# Patient Record
Sex: FEMALE | URBAN - METROPOLITAN AREA
[De-identification: names, ages, dates, MRNs, and addresses within clinical notes are randomized per-mention and may not be internally consistent; named-entity substitution may affect disease eponyms.]

---

## 2023-12-08 ENCOUNTER — ROUTINE PRENATAL (OUTPATIENT)
Dept: OBGYN CLINIC | Age: 23
End: 2023-12-08

## 2023-12-08 VITALS
HEIGHT: 64 IN | BODY MASS INDEX: 26.98 KG/M2 | DIASTOLIC BLOOD PRESSURE: 78 MMHG | WEIGHT: 158 LBS | SYSTOLIC BLOOD PRESSURE: 124 MMHG

## 2023-12-08 DIAGNOSIS — Z34.81 MULTIGRAVIDA IN FIRST TRIMESTER: ICD-10-CM

## 2023-12-08 DIAGNOSIS — Z12.4 PAP SMEAR FOR CERVICAL CANCER SCREENING: ICD-10-CM

## 2023-12-08 DIAGNOSIS — O36.80X0 ENCOUNTER TO DETERMINE FETAL VIABILITY OF PREGNANCY, SINGLE OR UNSPECIFIED FETUS: Primary | ICD-10-CM

## 2023-12-08 DIAGNOSIS — N83.201 RIGHT OVARIAN CYST: ICD-10-CM

## 2023-12-08 DIAGNOSIS — Z11.3 SCREEN FOR STD (SEXUALLY TRANSMITTED DISEASE): ICD-10-CM

## 2023-12-08 LAB
ABO + RH BLD: NORMAL
AMPHET UR QL SCN: NEGATIVE
BARBITURATES UR QL SCN: NEGATIVE
BENZODIAZ UR QL: NEGATIVE
BLOOD GROUP ANTIBODIES SERPL: NORMAL
CANNABINOIDS UR QL SCN: POSITIVE
COCAINE UR QL SCN: NEGATIVE
ERYTHROCYTE [DISTWIDTH] IN BLOOD BY AUTOMATED COUNT: 13.1 % (ref 11.9–14.6)
HBV SURFACE AG SER QL: NONREACTIVE
HCT VFR BLD AUTO: 38.6 % (ref 35.8–46.3)
HCV AB SER QL: NONREACTIVE
HGB BLD-MCNC: 12.5 G/DL (ref 11.7–15.4)
HIV 1+2 AB+HIV1 P24 AG SERPL QL IA: NONREACTIVE
MCH RBC QN AUTO: 27.7 PG (ref 26.1–32.9)
MCHC RBC AUTO-ENTMCNC: 32.4 G/DL (ref 31.4–35)
MCV RBC AUTO: 85.6 FL (ref 82–102)
METHADONE UR QL: NEGATIVE
NRBC # BLD: 0 K/UL (ref 0–0.2)
OPIATES UR QL: NEGATIVE
PCP UR QL: NEGATIVE
PLATELET # BLD AUTO: 344 K/UL (ref 150–450)
PMV BLD AUTO: 9.5 FL (ref 9.4–12.3)
RBC # BLD AUTO: 4.51 M/UL (ref 4.05–5.2)
RUBV IGG SERPL IA-ACNC: 62.5 IU/ML
WBC # BLD AUTO: 9.3 K/UL (ref 4.3–11.1)

## 2023-12-08 PROCEDURE — 99902 PR PRENATAL VISIT: CPT | Performed by: NURSE PRACTITIONER

## 2023-12-08 RX ORDER — ONDANSETRON 4 MG/1
8 TABLET, FILM COATED ORAL 2 TIMES DAILY PRN
COMMUNITY
Start: 2023-04-03 | End: 2023-12-08

## 2023-12-08 NOTE — PROGRESS NOTES
Chaperone for Intimate Exam     Chaperone was offer accepted as part of the rooming process    Chaperone: Cabrera Garcia
I have reviewed the patient's visit today including history, exam and assessment by TERESA Eller. I agree with treatment/plan as above.     Guille Beck MD  10:23 AM  12/08/23
Patient comes in today for initial prenatal visit. No complaints/concerns today. Fetal Movements:  No  Contractions:  No  Vaginal Bleeding:  No  Leaking Fluid:  No  GI/ issues:  Yes-nausea, dramamine is helpful. Drug/Alcohol 4P's Plus Screening    1. Have either of your parents ever had a problem with drugs/alcohol/prescription drugs? No  2. Does your partner have a problem with drugs/alcohol/prescription drugs? No  3. In the past, have you ever had a problem with drugs/alcohol/prescription drugs? No  4. Before you were pregnant, in the past month, have you done any drugs, drank any alcohol or abused any prescription drugs? No  If \"YES\" to any of the above, please give further details:  N/a     LAST PAP:  never     LAST MAMMO:  never     LMP:  Patient's last menstrual period was 10/08/2023.     FAMILY HISTORY OF:   Breast Cancer:  Yes-maternal aunt    Ovarian Cancer:  No   Uterine Cancer:  No   Colon Cancer:  No    Vitals:    12/08/23 0953   BP: 124/78   Site: Right Upper Arm   Position: Sitting   Weight: 71.7 kg (158 lb)        Sherryle Masters, RN  12/08/23  10:08 AM
12/8/2023: RIGHT OV CONTAINS A SIMPLE CYST MEASURING 4.0 X 2.8 X 3.9 CM          Problem List Items Addressed This Visit          Endocrine    Right ovarian cyst       Other    Multigravida in first trimester      History reviewed  PNV's ordered (if not already taking)  PN labs, UDS ordered  Problem list reviewed  OB physical completed  OB prenatal packet/education reviewed: Information included discusses general pregnancy topics, fetal development, weight gain, nutrition, breastfeeding, risky behaviors, WIC, vaccinations and hospital information. RTO 4 weeks with NIPT labs  PTL/labor precautions, [place], and pregnancy warning signs reviewed. Genetic testing - D/W pt at length genetic testing that is recommended by ACOG -- NIPT, Quad screen (for trisomies and NTD), CF, SMA. Brief discussion of these diseases - conditions that may increase risks, etiology, carrier states, fetal effects, treatment options, etc was undertaken. D/W pt that these are screening tests/carrier screening test only and are NOT mandatory. We also discuss false POS/false neg rates. It is her decision whether to have them done and how to proceed with the information afterwards.                Relevant Orders    PAP IG, CT-NG-TV, rfx Aptima HPV ASCUS ([id number])    ABO/Rh    Antibody Screen    CBC    Hemoglobinopathy Evaluation    Hepatitis B Surface Antigen    Hepatitis C Antibody    RPR    HIV 1/2 Ag/Ab, 4TH Generation,W Rflx Confirm    Rubella antibody, IgG    Hemoglobin A1C    Urine Drug Screen     Other Visit Diagnoses       Encounter to determine fetal viability of pregnancy, single or unspecified fetus    -  Primary    Relevant Orders    AMB POC US OB < 14 WKS, 1ST GESTATION (Completed)    Pap smear for cervical cancer screening        Relevant Orders    PAP IG, CT-NG-TV, rfx Aptima HPV ASCUS ([id number])    Screen for STD (sexually transmitted disease)        Relevant Orders    PAP IG, CT-NG-TV, rfx Aptima HPV ASCUS ([id number])

## 2023-12-08 NOTE — ASSESSMENT & PLAN NOTE
History reviewed  PNV's ordered (if not already taking)  PN labs, UDS ordered  Problem list reviewed  OB physical completed  OB prenatal packet/education reviewed: Information included discusses general pregnancy topics, fetal development, weight gain, nutrition, breastfeeding, risky behaviors, WIC, vaccinations and hospital information. RTO 4 weeks with NIPT labs  PTL/labor precautions, [place], and pregnancy warning signs reviewed. Genetic testing - D/W pt at length genetic testing that is recommended by ACOG -- NIPT, Quad screen (for trisomies and NTD), CF, SMA. Brief discussion of these diseases - conditions that may increase risks, etiology, carrier states, fetal effects, treatment options, etc was undertaken. D/W pt that these are screening tests/carrier screening test only and are NOT mandatory. We also discuss false POS/false neg rates. It is her decision whether to have them done and how to proceed with the information afterwards.

## 2023-12-09 LAB
EST. AVERAGE GLUCOSE BLD GHB EST-MCNC: 97 MG/DL
HBA1C MFR BLD: 5 % (ref 4.8–5.6)

## 2023-12-10 ENCOUNTER — TELEPHONE (OUTPATIENT)
Dept: OBGYN CLINIC | Age: 23
End: 2023-12-10

## 2023-12-10 DIAGNOSIS — O99.321 DRUG USE AFFECTING PREGNANCY, FIRST TRIMESTER: Primary | ICD-10-CM

## 2023-12-10 LAB — RPR SER QL: NONREACTIVE

## 2023-12-10 NOTE — TELEPHONE ENCOUNTER
Please call pt and let her know her drug screen was positive for marijuana      We recommend she discontinue use immediately and throughout the pregnancy.  Marijuana use in pregnancy has been linked with increase adverse risks for baby (autism) and possible PTB    We will repeat another drug screen later in the pregnancy

## 2023-12-12 LAB
HGB A MFR BLD: 97.6 % (ref 96.4–98.8)
HGB A2 MFR BLD COLUMN CHROM: 2.4 % (ref 1.8–3.2)
HGB F MFR BLD: 0 % (ref 0–2)
HGB FRACT BLD-IMP: NORMAL
HGB S MFR BLD: 0 %

## 2023-12-14 LAB
C TRACH RRNA CVX QL NAA+PROBE: NEGATIVE
COLLECTION METHOD: NORMAL
CYTOLOGIST CVX/VAG CYTO: NORMAL
CYTOLOGY CVX/VAG DOC THIN PREP: NORMAL
DATE OF LMP: NORMAL
HPV REFLEX: NORMAL
Lab: NORMAL
N GONORRHOEA RRNA CVX QL NAA+PROBE: NEGATIVE
OTHER PT INFO: NORMAL
PAP SOURCE: NORMAL
PATH REPORT.FINAL DX SPEC: NORMAL
PREV CYTO INFO: NORMAL
PREV TREATMENT RESULTS: NORMAL
PREV TREATMENT: NORMAL
STAT OF ADQ CVX/VAG CYTO-IMP: NORMAL
T VAGINALIS RRNA SPEC QL NAA+PROBE: NEGATIVE

## 2024-01-04 PROBLEM — N83.201 RIGHT OVARIAN CYST: Status: RESOLVED | Noted: 2023-12-08 | Resolved: 2024-01-04

## 2024-01-05 ENCOUNTER — ROUTINE PRENATAL (OUTPATIENT)
Dept: OBGYN CLINIC | Age: 24
End: 2024-01-05

## 2024-01-05 VITALS
SYSTOLIC BLOOD PRESSURE: 122 MMHG | DIASTOLIC BLOOD PRESSURE: 68 MMHG | WEIGHT: 167 LBS | BODY MASS INDEX: 28.51 KG/M2 | HEIGHT: 64 IN

## 2024-01-05 DIAGNOSIS — Z34.81 ENCOUNTER FOR SUPERVISION OF OTHER NORMAL PREGNANCY IN FIRST TRIMESTER: ICD-10-CM

## 2024-01-05 DIAGNOSIS — Z34.81 MULTIGRAVIDA IN FIRST TRIMESTER: Primary | ICD-10-CM

## 2024-01-05 DIAGNOSIS — Z34.01 ENCOUNTER FOR SUPERVISION OF NORMAL FIRST PREGNANCY IN FIRST TRIMESTER: ICD-10-CM

## 2024-01-05 DIAGNOSIS — O99.321 DRUG USE AFFECTING PREGNANCY, FIRST TRIMESTER: ICD-10-CM

## 2024-01-05 RX ORDER — ONDANSETRON 4 MG/1
4 TABLET, ORALLY DISINTEGRATING ORAL 3 TIMES DAILY PRN
Qty: 28 TABLET | Refills: 1 | Status: SHIPPED | OUTPATIENT
Start: 2024-01-05

## 2024-01-05 SDOH — ECONOMIC STABILITY: FOOD INSECURITY: WITHIN THE PAST 12 MONTHS, YOU WORRIED THAT YOUR FOOD WOULD RUN OUT BEFORE YOU GOT MONEY TO BUY MORE.: NEVER TRUE

## 2024-01-05 SDOH — ECONOMIC STABILITY: HOUSING INSECURITY
IN THE LAST 12 MONTHS, WAS THERE A TIME WHEN YOU DID NOT HAVE A STEADY PLACE TO SLEEP OR SLEPT IN A SHELTER (INCLUDING NOW)?: NO

## 2024-01-05 SDOH — ECONOMIC STABILITY: TRANSPORTATION INSECURITY
IN THE PAST 12 MONTHS, HAS LACK OF TRANSPORTATION KEPT YOU FROM MEETINGS, WORK, OR FROM GETTING THINGS NEEDED FOR DAILY LIVING?: NO

## 2024-01-05 SDOH — ECONOMIC STABILITY: INCOME INSECURITY: HOW HARD IS IT FOR YOU TO PAY FOR THE VERY BASICS LIKE FOOD, HOUSING, MEDICAL CARE, AND HEATING?: NOT VERY HARD

## 2024-01-05 SDOH — ECONOMIC STABILITY: FOOD INSECURITY: WITHIN THE PAST 12 MONTHS, THE FOOD YOU BOUGHT JUST DIDN'T LAST AND YOU DIDN'T HAVE MONEY TO GET MORE.: NEVER TRUE

## 2024-01-05 ASSESSMENT — PATIENT HEALTH QUESTIONNAIRE - PHQ9
SUM OF ALL RESPONSES TO PHQ QUESTIONS 1-9: 0
2. FEELING DOWN, DEPRESSED OR HOPELESS: 0
SUM OF ALL RESPONSES TO PHQ QUESTIONS 1-9: 0
1. LITTLE INTEREST OR PLEASURE IN DOING THINGS: 0
SUM OF ALL RESPONSES TO PHQ QUESTIONS 1-9: 0
SUM OF ALL RESPONSES TO PHQ QUESTIONS 1-9: 0
SUM OF ALL RESPONSES TO PHQ9 QUESTIONS 1 & 2: 0

## 2024-01-05 NOTE — PROGRESS NOTES
Patient comes in today for routine prenatal visit. No complaints/concerns today.     Fetal Movement: No  Contractions: No  Vaginal Bleeding: No  Leaking Fluid: No  GI/: Yes-nausea, dramamine and shahana chewables, slightly helpful.     Vitals:    01/05/24 0915   BP: 122/68   Site: Right Upper Arm   Position: Sitting   Weight: 75.8 kg (167 lb)   Height: 1.613 m (5' 3.5\")

## 2024-01-05 NOTE — ASSESSMENT & PLAN NOTE
Instructed pt to contact the office or seek immediate care if develops fever > 101.0, severe lower abdominal pain or heavy vaginal bleeding (soaking 2 or more pads per hour).    D/W pt at length genetic testing that is recommended by ACOG -- NIPT, Quad screen (for trisomies and NTD), CF, SMA.  Brief discussion of these diseases - conditions that may increase risks, etiology, carrier states, fetal effects, treatment options, etc was undertaken. D/W pt that these are screening tests only and are NOT mandatory. It is her decision whether to have them done and how to proceed with the information afterwards.  All questions answered, pt understood and wishes to proceed with indicated tests.

## 2024-01-05 NOTE — PROGRESS NOTES
Chief Complaint   Patient presents with    Routine Prenatal Visit        This 23 y.o.  at 12w5d with Estimated Date of Delivery: 24 presents for routine prenatal visit. Patient has some complaints today. Pt reports no LOF, VB, ctx. Pt denies H/A, vision changes, abdom pain, (+) N/V.    Vitals:    24 0915   BP: 122/68   Site: Right Upper Arm   Position: Sitting   Weight: 75.8 kg (167 lb)   Height: 1.613 m (5' 3.5\")        Patient Active Problem List    Diagnosis Date Noted    Drug use affecting pregnancy, first trimester 12/10/2023     Overview Note:     (+) THC at initial OBV -- recheck 3rd trimester     NEEDS CORD SEGMENT AT DELIVERY         Assessment & Plan Note:     noted      Multigravida in first trimester 2023     Overview Note:     EDC by LMP confirmed by 8 5/7 week US -- NULLIPAROUS       Assessment & Plan Note:     Instructed pt to contact the office or seek immediate care if develops fever > 101.0, severe lower abdominal pain or heavy vaginal bleeding (soaking 2 or more pads per hour).    D/W pt at length genetic testing that is recommended by ACOG -- NIPT, Quad screen (for trisomies and NTD), CF, SMA.  Brief discussion of these diseases - conditions that may increase risks, etiology, carrier states, fetal effects, treatment options, etc was undertaken. D/W pt that these are screening tests only and are NOT mandatory. It is her decision whether to have them done and how to proceed with the information afterwards.  All questions answered, pt understood and wishes to proceed with indicated tests.         Problem List Items Addressed This Visit       Multigravida in first trimester - Primary     Instructed pt to contact the office or seek immediate care if develops fever > 101.0, severe lower abdominal pain or heavy vaginal bleeding (soaking 2 or more pads per hour).    D/W pt at length genetic testing that is recommended by ACOG -- NIPT, Quad screen (for trisomies and NTD), CF, SMA.

## 2024-01-05 NOTE — PATIENT INSTRUCTIONS
We will call you if anything is abnormal from your testing today.  Please contact the office or seek immediate care if you develop fever > 101.0, severe lower abdominal pain or heavy vaginal bleeding (soaking 2 or more pads per hour).   Thanks for coming to see us today and letting us take care of you!

## 2024-01-12 LAB
Lab: NEGATIVE
Lab: NEGATIVE
Lab: NORMAL
NTRA 1P36 DELETION SYNDROME POPULATION-BASED RISK TEXT: NORMAL
NTRA 1P36 DELETION SYNDROME RESULT TEXT: NORMAL
NTRA 1P36 DELETION SYNDROME RISK SCORE TEXT: NORMAL
NTRA 22Q11.2 DELETION SYNDROME POPULATION-BASED RISK TEXT: NORMAL
NTRA 22Q11.2 DELETION SYNDROME RESULT TEXT: NORMAL
NTRA 22Q11.2 DELETION SYNDROME RISK SCORE TEXT: NORMAL
NTRA ANGELMAN SYNDROME POPULATION-BASED RISK TEXT: NORMAL
NTRA ANGELMAN SYNDROME RESULT TEXT: NORMAL
NTRA ANGELMAN SYNDROME RISK SCORE TEXT: NORMAL
NTRA CRI-DU-CHAT SYNDROME POPULATION-BASED RISK TEXT: NORMAL
NTRA CRI-DU-CHAT SYNDROME RESULT TEXT: NORMAL
NTRA CRI-DU-CHAT SYNDROME RISK SCORE TEXT: NORMAL
NTRA CYSTIC FIBROSIS: NEGATIVE
NTRA DUCHENNE/BECKER MUSCULAR DYSTROPHY: NEGATIVE
NTRA FETAL FRACTION SECOND FETUS: NORMAL
NTRA FETAL FRACTION: NORMAL
NTRA FRAGILE X SYNDROME: NEGATIVE
NTRA GENDER OF FETUS: NORMAL
NTRA GENDER OF SECOND FETUS: NORMAL
NTRA MONOSOMY X AGE-BASED RISK TEXT: NORMAL
NTRA MONOSOMY X RESULT TEXT: NORMAL
NTRA MONOSOMY X RISK SCORE TEXT: NORMAL
NTRA PDF REPORT: NORMAL
NTRA PRADER-WILLI SYNDROME POPULATION-BASED RISK TEXT: NORMAL
NTRA PRADER-WILLI SYNDROME RESULT TEXT: NORMAL
NTRA PRADER-WILLI SYNDROME RISK SCORE TEXT: NORMAL
NTRA SPINAL MUSCULAR ATROPHY: NEGATIVE
NTRA TRIPLOIDY 13 18 AGE-BASED RISK TEXT: NORMAL
NTRA TRIPLOIDY 13 18 RESULT TEXT: NORMAL
NTRA TRIPLOIDY 13 18 RISK SCORE TEXT: NORMAL
NTRA TRIPLOIDY RESULT TEXT: NORMAL
NTRA TRISOMY 13 AGE-BASED RISK TEXT: NORMAL
NTRA TRISOMY 13 RESULT TEXT: NORMAL
NTRA TRISOMY 13 RISK SCORE TEXT: NORMAL
NTRA TRISOMY 18 AGE-BASED RISK TEXT: NORMAL
NTRA TRISOMY 18 RESULT TEXT: NORMAL
NTRA TRISOMY 18 RISK SCORE TEXT: NORMAL
NTRA TRISOMY 21 AGE-BASED RISK TEXT: NORMAL
NTRA TRISOMY 21 RESULT TEXT: NORMAL
NTRA TRISOMY 21 RISK SCORE TEXT: NORMAL
NTRA ZYGOSITY: NORMAL

## 2024-01-16 LAB
Lab: NORMAL
NTRA FETAL FRACTION: NORMAL
NTRA GENDER OF FETUS: NORMAL
NTRA MONOSOMY X AGE-BASED RISK TEXT: NORMAL
NTRA MONOSOMY X RESULT TEXT: NORMAL
NTRA MONOSOMY X RISK SCORE TEXT: NORMAL
NTRA TRIPLOIDY RESULT TEXT: NORMAL
NTRA TRISOMY 13 AGE-BASED RISK TEXT: NORMAL
NTRA TRISOMY 13 RESULT TEXT: NORMAL
NTRA TRISOMY 13 RISK SCORE TEXT: NORMAL
NTRA TRISOMY 18 AGE-BASED RISK TEXT: NORMAL
NTRA TRISOMY 18 RESULT TEXT: NORMAL
NTRA TRISOMY 18 RISK SCORE TEXT: NORMAL
NTRA TRISOMY 21 AGE-BASED RISK TEXT: NORMAL
NTRA TRISOMY 21 RESULT TEXT: NORMAL
NTRA TRISOMY 21 RISK SCORE TEXT: NORMAL

## 2024-01-17 ENCOUNTER — TELEPHONE (OUTPATIENT)
Dept: OBGYN CLINIC | Age: 24
End: 2024-01-17

## 2024-01-17 NOTE — TELEPHONE ENCOUNTER
----- Message from Yannick Sarabia MD sent at 1/17/2024  8:06 AM EST -----  Please let pt know that all her genetic testing was normal/negative  If she wants to know -- it's a BOY

## 2024-01-17 NOTE — TELEPHONE ENCOUNTER
Patient returned call. Patient was informed of results. Patient states she seen the gender on My Chart. Patient voiced understanding.

## 2024-02-05 ENCOUNTER — ROUTINE PRENATAL (OUTPATIENT)
Dept: OBGYN CLINIC | Age: 24
End: 2024-02-05
Payer: COMMERCIAL

## 2024-02-05 VITALS
OXYGEN SATURATION: 98 % | WEIGHT: 177 LBS | HEART RATE: 89 BPM | HEIGHT: 64 IN | DIASTOLIC BLOOD PRESSURE: 64 MMHG | SYSTOLIC BLOOD PRESSURE: 108 MMHG | BODY MASS INDEX: 30.22 KG/M2

## 2024-02-05 DIAGNOSIS — R00.2 HEART PALPITATIONS: ICD-10-CM

## 2024-02-05 DIAGNOSIS — O99.321 DRUG USE AFFECTING PREGNANCY, FIRST TRIMESTER: ICD-10-CM

## 2024-02-05 DIAGNOSIS — Z34.82 MULTIGRAVIDA IN SECOND TRIMESTER: Primary | ICD-10-CM

## 2024-02-05 DIAGNOSIS — Z34.82 MULTIGRAVIDA IN SECOND TRIMESTER: ICD-10-CM

## 2024-02-05 LAB
ALBUMIN SERPL-MCNC: 3.3 G/DL (ref 3.5–5)
ALBUMIN/GLOB SERPL: 0.9 (ref 0.4–1.6)
ALP SERPL-CCNC: 56 U/L (ref 50–136)
ALT SERPL-CCNC: 97 U/L (ref 12–65)
ANION GAP SERPL CALC-SCNC: 4 MMOL/L (ref 2–11)
AST SERPL-CCNC: 72 U/L (ref 15–37)
BILIRUB SERPL-MCNC: 0.4 MG/DL (ref 0.2–1.1)
BUN SERPL-MCNC: 6 MG/DL (ref 6–23)
CALCIUM SERPL-MCNC: 9.5 MG/DL (ref 8.3–10.4)
CHLORIDE SERPL-SCNC: 108 MMOL/L (ref 103–113)
CO2 SERPL-SCNC: 23 MMOL/L (ref 21–32)
CREAT SERPL-MCNC: 0.7 MG/DL (ref 0.6–1)
ERYTHROCYTE [DISTWIDTH] IN BLOOD BY AUTOMATED COUNT: 12.8 % (ref 11.9–14.6)
GLOBULIN SER CALC-MCNC: 3.5 G/DL (ref 2.8–4.5)
GLUCOSE SERPL-MCNC: 78 MG/DL (ref 65–100)
HCT VFR BLD AUTO: 35.2 % (ref 35.8–46.3)
HGB BLD-MCNC: 11.5 G/DL (ref 11.7–15.4)
MAGNESIUM SERPL-MCNC: 2 MG/DL (ref 1.8–2.4)
MCH RBC QN AUTO: 27.8 PG (ref 26.1–32.9)
MCHC RBC AUTO-ENTMCNC: 32.7 G/DL (ref 31.4–35)
MCV RBC AUTO: 85 FL (ref 82–102)
NRBC # BLD: 0 K/UL (ref 0–0.2)
PLATELET # BLD AUTO: 315 K/UL (ref 150–450)
PMV BLD AUTO: 9.4 FL (ref 9.4–12.3)
POTASSIUM SERPL-SCNC: 4.4 MMOL/L (ref 3.5–5.1)
PROT SERPL-MCNC: 6.8 G/DL (ref 6.3–8.2)
RBC # BLD AUTO: 4.14 M/UL (ref 4.05–5.2)
SODIUM SERPL-SCNC: 135 MMOL/L (ref 136–146)
T4 FREE SERPL-MCNC: 0.9 NG/DL (ref 0.78–1.46)
TSH, 3RD GENERATION: 1.86 UIU/ML (ref 0.36–3.74)
WBC # BLD AUTO: 10.6 K/UL (ref 4.3–11.1)

## 2024-02-05 PROCEDURE — 99213 OFFICE O/P EST LOW 20 MIN: CPT | Performed by: NURSE PRACTITIONER

## 2024-02-05 NOTE — ASSESSMENT & PLAN NOTE
PTL/labor precautions, FMC, and pregnancy warning signs reviewed. Pt advised to call the office at [phone] or go straight to Labor and Delivery at [facility] with any of the following concerns vaginal bleeding, leaking of fluid, steve regularly Q 5-7 minutes for over an hour or not feeling the baby move.   RTO 4 weeks OBV, anatomy US

## 2024-02-05 NOTE — PROGRESS NOTES
This is a 24 y.o.   at 17w1d for routine OB visit.    Her Estimated Due Date is 2024, by Ultrasound    Denies leaking of fluid, vaginal bleeding, or regular contractions. Reports fetal movement.     Pt reports her heart rate has been in the 100s when at work. Highest she has seen is 110. States she only felt once that she was dizzy. But did not eat that day. Also having trouble getting in enough water. We discuss adding flavoring. Pt reports headaches when she is not hydrated. But resolve with liquid IV    Current Outpatient Medications on File Prior to Visit   Medication Sig Dispense Refill    Prenatal w/o A Vit-Fe Fum-FA (PRENATA PO) Take by mouth      ondansetron (ZOFRAN-ODT) 4 MG disintegrating tablet Take 1 tablet by mouth 3 times daily as needed for Nausea or Vomiting (Patient not taking: Reported on 2024) 28 tablet 1    dimenhyDRINATE (DRAMAMINE PO) Take by mouth (Patient not taking: Reported on 2024)       No current facility-administered medications on file prior to visit.       Allergies   Allergen Reactions    Latex Itching and Rash    Peanut Allergen Powder-Dnfp Anaphylaxis       OB History    Para Term  AB Living   2 0 0 0 1 0   SAB IAB Ectopic Molar Multiple Live Births   0 1 0 0 0 0       # 1 - Date: , Sex: None, Weight: None, GA: None, Delivery: None, Apgar1: None, Apgar5: None, Living: None, Birth Comments: None    # 2 - Date: None, Sex: None, Weight: None, GA: None, Delivery: None, Apgar1: None, Apgar5: None, Living: None, Birth Comments: None        Past Medical History:   Diagnosis Date    Migraines        Past Surgical History:   Procedure Laterality Date    ACHILLES TENDON SURGERY         Family History   Problem Relation Age of Onset    Breast Cancer Maternal Aunt     Diabetes Father     Colon Cancer Neg Hx     Ovarian Cancer Neg Hx     Uterine Cancer Neg Hx        Social History     Socioeconomic History    Marital status: Unknown     Spouse name: Not on

## 2024-02-05 NOTE — ASSESSMENT & PLAN NOTE
Pt admits to subpr hydration, also skipping meals  We discuss calories/water needs in pregnancy  Pt verbalizes understanding  Exam today nl  Will check tsh, cbc, cmp, mag with afp today

## 2024-02-05 NOTE — PROGRESS NOTES
Patient comes in today for routine prenatal visit.   Pt states she has been getting headaches, has been taking Tylenol.  Pt states her pulse has been high, the other day she felt lightheaded and dizzy. No chest pain. Pt is trying to stay hydrated.     Fetal Movement: No  Contractions: No  Vaginal Bleeding: No  Leaking Fluid: No  GI/: No    Vitals:    02/05/24 1009   BP: 108/64   Site: Left Upper Arm   Position: Sitting   Pulse: 89   SpO2: 98%   Weight: 80.3 kg (177 lb)   Height: 1.613 m (5' 3.5\")

## 2024-02-05 NOTE — PROGRESS NOTES
I have reviewed the patient's visit today including history, exam and assessment by TERESA Álvarez.  I agree with treatment/plan as above.    Yannick Sarabia MD  11:10 AM  02/05/24

## 2024-02-06 ENCOUNTER — TELEPHONE (OUTPATIENT)
Dept: OBGYN CLINIC | Age: 24
End: 2024-02-06

## 2024-02-06 DIAGNOSIS — R74.8 ELEVATED LIVER ENZYMES: Primary | ICD-10-CM

## 2024-02-06 NOTE — TELEPHONE ENCOUNTER
Please let pt know her liver enzymes were elevated.     I would like to repeat FASTING this week. Could be elevated if she ate prior to testing. We will also add on testing for hepatitis (liver infection) to make sure that is normal.

## 2024-02-09 DIAGNOSIS — R74.8 ELEVATED LIVER ENZYMES: ICD-10-CM

## 2024-02-09 LAB
AFP INTERP SERPL-IMP: NORMAL
AFP MOM SERPL: 0.79
AFP SERPL-MCNC: 27.9 NG/ML
AGE AT DELIVERY: 22.4 YR
COMMENT: NORMAL
DONOR EGG?: NO
ERYTHROCYTE [DISTWIDTH] IN BLOOD BY AUTOMATED COUNT: 12.9 % (ref 11.9–14.6)
GA METHOD: NORMAL
GA: 17.1 WEEKS
HCT VFR BLD AUTO: 33.8 % (ref 35.8–46.3)
HGB BLD-MCNC: 10.9 G/DL (ref 11.7–15.4)
IDDM PATIENT QL: NO
Lab: NORMAL
MAT SCN FOR FETAL ABNORMALITIES SERPL: NORMAL
MCH RBC QN AUTO: 27.9 PG (ref 26.1–32.9)
MCHC RBC AUTO-ENTMCNC: 32.2 G/DL (ref 31.4–35)
MCV RBC AUTO: 86.7 FL (ref 82–102)
MULTIPLE PREGNANCY: NO
NEURAL TUBE DEFECT RISK FETUS: NORMAL
NRBC # BLD: 0 K/UL (ref 0–0.2)
NUMBER OF FETUSES: NO
OTHER INDICATIONS: NO
PLATELET # BLD AUTO: 300 K/UL (ref 150–450)
PMV BLD AUTO: 9.6 FL (ref 9.4–12.3)
PREVIOUSLY ELEVATED AFP (Y OR N): NO
PRIOR 1ST TRIM TESTING ?: NO
PRIOR 2ND TRIM TESTING ?: NO
PRIOR DS/NTD SCREEN CURRENT PREGNANCY?: NO
PRIOR PREGNANCY WITH DOWN SYNDROME (Y OR N): NO
RBC # BLD AUTO: 3.9 M/UL (ref 4.05–5.2)
WBC # BLD AUTO: 9.6 K/UL (ref 4.3–11.1)

## 2024-02-10 LAB
ALBUMIN SERPL-MCNC: 3.2 G/DL (ref 3.5–5)
ALBUMIN/GLOB SERPL: 0.9 (ref 0.4–1.6)
ALP SERPL-CCNC: 53 U/L (ref 50–136)
ALT SERPL-CCNC: 81 U/L (ref 12–65)
ANION GAP SERPL CALC-SCNC: 5 MMOL/L (ref 2–11)
AST SERPL-CCNC: 34 U/L (ref 15–37)
BILIRUB SERPL-MCNC: 0.3 MG/DL (ref 0.2–1.1)
BUN SERPL-MCNC: 8 MG/DL (ref 6–23)
CALCIUM SERPL-MCNC: 9.6 MG/DL (ref 8.3–10.4)
CHLORIDE SERPL-SCNC: 108 MMOL/L (ref 103–113)
CO2 SERPL-SCNC: 24 MMOL/L (ref 21–32)
CREAT SERPL-MCNC: 0.7 MG/DL (ref 0.6–1)
GLOBULIN SER CALC-MCNC: 3.4 G/DL (ref 2.8–4.5)
GLUCOSE SERPL-MCNC: 92 MG/DL (ref 65–100)
HAV IGM SER QL: NONREACTIVE
HBV CORE IGM SER QL: NONREACTIVE
HBV SURFACE AG SER QL: NONREACTIVE
HCV AB SER QL: NONREACTIVE
POTASSIUM SERPL-SCNC: 4.1 MMOL/L (ref 3.5–5.1)
PROT SERPL-MCNC: 6.6 G/DL (ref 6.3–8.2)
SODIUM SERPL-SCNC: 137 MMOL/L (ref 136–146)

## 2024-02-11 ENCOUNTER — TELEPHONE (OUTPATIENT)
Dept: OBGYN CLINIC | Age: 24
End: 2024-02-11

## 2024-02-11 DIAGNOSIS — O99.012 ANEMIA AFFECTING PREGNANCY IN SECOND TRIMESTER: Primary | ICD-10-CM

## 2024-02-11 PROBLEM — R74.8 ELEVATED LIVER ENZYMES: Status: ACTIVE | Noted: 2024-02-11

## 2024-02-11 NOTE — TELEPHONE ENCOUNTER
ALT again elevated, AST normal.   Hepatitis panel is normal  Recommend liver ultrasound. Referral placed. Please give pt number to schedule        2. Please call patient and let her know that her hemoglobin was low. She needs to start taking     FeSO4 325mg PO BID Disp: 60 RF:6  Colace 100mg PO BID Disp: 60 RF: 6 prn for constipation    Also encourage foods that are high in iron. Also increase fluids and foods high in fiber to prevent constipation.

## 2024-02-13 RX ORDER — FERROUS SULFATE 325(65) MG
325 TABLET ORAL 2 TIMES DAILY
Qty: 60 TABLET | Refills: 6 | Status: SHIPPED | OUTPATIENT
Start: 2024-02-13

## 2024-02-13 RX ORDER — DOCUSATE SODIUM 100 MG/1
100 CAPSULE, LIQUID FILLED ORAL 2 TIMES DAILY PRN
Qty: 60 CAPSULE | Refills: 6 | Status: SHIPPED | OUTPATIENT
Start: 2024-02-13

## 2024-02-13 NOTE — TELEPHONE ENCOUNTER
Called patient and updated her on information/lab results below.     Rxs sent in.  Phone number for patient to schedule her US was given.    Patient verbalized understanding and denied having any questions at this time.

## 2024-02-19 DIAGNOSIS — Z34.82 MULTIGRAVIDA IN SECOND TRIMESTER: ICD-10-CM

## 2024-02-19 DIAGNOSIS — R74.8 ELEVATED LIVER ENZYMES: Primary | ICD-10-CM

## 2024-02-29 ENCOUNTER — TELEPHONE (OUTPATIENT)
Dept: OBGYN CLINIC | Age: 24
End: 2024-02-29

## 2024-02-29 ENCOUNTER — HOSPITAL ENCOUNTER (OUTPATIENT)
Dept: ULTRASOUND IMAGING | Age: 24
Discharge: HOME | End: 2024-02-29
Payer: COMMERCIAL

## 2024-02-29 DIAGNOSIS — Z34.82 MULTIGRAVIDA IN SECOND TRIMESTER: ICD-10-CM

## 2024-02-29 DIAGNOSIS — R74.8 ELEVATED LIVER ENZYMES: ICD-10-CM

## 2024-02-29 PROCEDURE — 76705 ECHO EXAM OF ABDOMEN: CPT

## 2024-02-29 NOTE — TELEPHONE ENCOUNTER
Please let pt know liver ultrasound is normal.     Recommend we recheck labs in 1 month, will schedule when she comes for visit on 3/4/24    In meantime, avoid any products containing acetaminophen (tylenol)     Please call immediately if you have a headache that won't go away with tylenol, changes to your vision like funny lights or blurriness, nausea or vomiting, or upper abdominal pain.

## 2024-03-01 NOTE — TELEPHONE ENCOUNTER
Pt read [employer] message. No response so encounter closed.     Last read by Sara España at  5:39 PM on 2/29/2024

## 2024-03-04 ENCOUNTER — ROUTINE PRENATAL (OUTPATIENT)
Dept: OBGYN CLINIC | Age: 24
End: 2024-03-04
Payer: COMMERCIAL

## 2024-03-04 ENCOUNTER — PROCEDURE VISIT (OUTPATIENT)
Dept: OBGYN CLINIC | Age: 24
End: 2024-03-04
Payer: COMMERCIAL

## 2024-03-04 VITALS
SYSTOLIC BLOOD PRESSURE: 118 MMHG | WEIGHT: 186 LBS | DIASTOLIC BLOOD PRESSURE: 72 MMHG | BODY MASS INDEX: 31.76 KG/M2 | HEIGHT: 64 IN

## 2024-03-04 DIAGNOSIS — O99.321 DRUG USE AFFECTING PREGNANCY, FIRST TRIMESTER: ICD-10-CM

## 2024-03-04 DIAGNOSIS — R74.8 ELEVATED LIVER ENZYMES: ICD-10-CM

## 2024-03-04 DIAGNOSIS — R74.8 ELEVATED LIVER ENZYMES: Primary | ICD-10-CM

## 2024-03-04 DIAGNOSIS — O99.012 ANEMIA AFFECTING PREGNANCY IN SECOND TRIMESTER: ICD-10-CM

## 2024-03-04 DIAGNOSIS — O35.BXX1 ECHOGENIC FOCUS OF HEART OF FETUS AFFECTING ANTEPARTUM CARE OF MOTHER, FETUS 1: ICD-10-CM

## 2024-03-04 DIAGNOSIS — Z36.89 ENCOUNTER FOR FETAL ANATOMIC SURVEY: ICD-10-CM

## 2024-03-04 DIAGNOSIS — O35.BXX0 ECHOGENIC FOCUS OF HEART OF FETUS AFFECTING ANTEPARTUM CARE OF MOTHER, SINGLE OR UNSPECIFIED FETUS: Primary | ICD-10-CM

## 2024-03-04 DIAGNOSIS — Z34.82 MULTIGRAVIDA IN SECOND TRIMESTER: ICD-10-CM

## 2024-03-04 LAB
ALBUMIN SERPL-MCNC: 3.2 G/DL (ref 3.5–5)
ALBUMIN/GLOB SERPL: 0.9 (ref 0.4–1.6)
ALP SERPL-CCNC: 71 U/L (ref 50–136)
ALT SERPL-CCNC: 51 U/L (ref 12–65)
ANION GAP SERPL CALC-SCNC: 7 MMOL/L (ref 2–11)
AST SERPL-CCNC: 24 U/L (ref 15–37)
BILIRUB SERPL-MCNC: 0.5 MG/DL (ref 0.2–1.1)
BUN SERPL-MCNC: 8 MG/DL (ref 6–23)
CALCIUM SERPL-MCNC: 9.6 MG/DL (ref 8.3–10.4)
CHLORIDE SERPL-SCNC: 106 MMOL/L (ref 103–113)
CO2 SERPL-SCNC: 23 MMOL/L (ref 21–32)
CREAT SERPL-MCNC: 0.6 MG/DL (ref 0.6–1)
GLOBULIN SER CALC-MCNC: 3.7 G/DL (ref 2.8–4.5)
GLUCOSE SERPL-MCNC: 74 MG/DL (ref 65–100)
POTASSIUM SERPL-SCNC: 4.3 MMOL/L (ref 3.5–5.1)
PROT SERPL-MCNC: 6.9 G/DL (ref 6.3–8.2)
SODIUM SERPL-SCNC: 136 MMOL/L (ref 136–146)

## 2024-03-04 PROCEDURE — 76805 OB US >/= 14 WKS SNGL FETUS: CPT | Performed by: OBSTETRICS & GYNECOLOGY

## 2024-03-04 PROCEDURE — 99213 OFFICE O/P EST LOW 20 MIN: CPT | Performed by: NURSE PRACTITIONER

## 2024-03-04 NOTE — PROGRESS NOTES
This is a 24 y.o.   at 21w1d for routine OB visit.    Her Estimated Due Date is 2024, by Ultrasound    Denies leaking of fluid, vaginal bleeding, or regular contractions. Reports fetal movement.     Current Outpatient Medications on File Prior to Visit   Medication Sig Dispense Refill    ferrous sulfate (IRON 325) 325 (65 Fe) MG tablet Take 1 tablet by mouth 2 times daily 60 tablet 6    docusate sodium (COLACE) 100 MG capsule Take 1 capsule by mouth 2 times daily as needed for Constipation 60 capsule 6    Prenatal w/o A Vit-Fe Fum-FA (PRENATA PO) Take by mouth       No current facility-administered medications on file prior to visit.       Allergies   Allergen Reactions    Latex Itching and Rash    Peanut Allergen Powder-Dnfp Anaphylaxis       OB History    Para Term  AB Living   2 0 0 0 1 0   SAB IAB Ectopic Molar Multiple Live Births   0 1 0 0 0 0       # 1 - Date: , Sex: None, Weight: None, GA: None, Delivery: None, Apgar1: None, Apgar5: None, Living: None, Birth Comments: None    # 2 - Date: None, Sex: None, Weight: None, GA: None, Delivery: None, Apgar1: None, Apgar5: None, Living: None, Birth Comments: None        Past Medical History:   Diagnosis Date    Migraines        Past Surgical History:   Procedure Laterality Date    ACHILLES TENDON SURGERY         Family History   Problem Relation Age of Onset    Breast Cancer Maternal Aunt     Diabetes Father     Colon Cancer Neg Hx     Ovarian Cancer Neg Hx     Uterine Cancer Neg Hx        Social History     Socioeconomic History    Marital status: Single     Spouse name: Not on file    Number of children: Not on file    Years of education: Not on file    Highest education level: Not on file   Occupational History    Not on file   Tobacco Use    Smoking status: Never    Smokeless tobacco: Never   Vaping Use    Vaping Use: Former    Start date: 2019    Quit date: 2023   Substance and Sexual Activity    Alcohol use: Not

## 2024-03-04 NOTE — ASSESSMENT & PLAN NOTE
PTL/labor precautions, FMC, and pregnancy warning signs reviewed. Pt advised to call the office at [phone] or go straight to Labor and Delivery at [facility] with any of the following concerns vaginal bleeding, leaking of fluid, steve regularly Q 5-7 minutes for over an hour or not feeling the baby move.   RTO 4 weeks OBV     not examined

## 2024-03-04 NOTE — PROGRESS NOTES
Patient comes in today for routine prenatal visit. No complaints/concerns today.     Fetal Movement: Yes  Contractions: No  Vaginal Bleeding: No  Leaking Fluid: No  GI/: No    Vitals:    03/04/24 1027   BP: 118/72   Site: Left Upper Arm   Position: Sitting   Weight: 84.4 kg (186 lb)   Height: 1.613 m (5' 3.5\")

## 2024-04-01 ENCOUNTER — ROUTINE PRENATAL (OUTPATIENT)
Dept: OBGYN CLINIC | Age: 24
End: 2024-04-01
Payer: COMMERCIAL

## 2024-04-01 VITALS
WEIGHT: 192 LBS | SYSTOLIC BLOOD PRESSURE: 110 MMHG | BODY MASS INDEX: 32.78 KG/M2 | DIASTOLIC BLOOD PRESSURE: 68 MMHG | HEIGHT: 64 IN

## 2024-04-01 DIAGNOSIS — O99.012 ANEMIA AFFECTING PREGNANCY IN SECOND TRIMESTER: ICD-10-CM

## 2024-04-01 DIAGNOSIS — O35.BXX1 ECHOGENIC FOCUS OF HEART OF FETUS AFFECTING ANTEPARTUM CARE OF MOTHER, FETUS 1: Primary | ICD-10-CM

## 2024-04-01 DIAGNOSIS — Z34.82 MULTIGRAVIDA IN SECOND TRIMESTER: ICD-10-CM

## 2024-04-01 DIAGNOSIS — O99.321 DRUG USE AFFECTING PREGNANCY, FIRST TRIMESTER: ICD-10-CM

## 2024-04-01 PROBLEM — R74.8 ELEVATED LIVER ENZYMES: Status: RESOLVED | Noted: 2024-02-11 | Resolved: 2024-04-01

## 2024-04-01 PROCEDURE — 99213 OFFICE O/P EST LOW 20 MIN: CPT | Performed by: OBSTETRICS & GYNECOLOGY

## 2024-04-01 NOTE — ASSESSMENT & PLAN NOTE
Educated patient of signs and symptoms of  labor including but not limited to regular uterine contractions every 5-7 minutes for 1 hour, vaginal bleeding or leakage of fluid to seek immediate care.

## 2024-04-01 NOTE — PROGRESS NOTES
Patient comes in today for routine prenatal visit. No complaints/concerns today.     Fetal Movement: Yes  Contractions: No  Vaginal Bleeding: No  Leaking Fluid: No  GI/: No    Vitals:    04/01/24 1417   BP: 110/68   Site: Right Upper Arm   Position: Sitting   Weight: 87.1 kg (192 lb)   Height: 1.613 m (5' 3.5\")

## 2024-04-01 NOTE — PROGRESS NOTES
Chief Complaint   Patient presents with    Routine Prenatal Visit        This 24 y.o.  at 25w1d with Estimated Date of Delivery: 24 presents for routine prenatal visit. Patient has no complaints today. Pt reports good FM, no LOF, VB, ctx. Pt denies H/A, vision changes, abdom pain, N/V.    Vitals:    24 1417   BP: 110/68   Site: Right Upper Arm   Position: Sitting   Weight: 87.1 kg (192 lb)   Height: 1.613 m (5' 3.5\")        Patient Active Problem List    Diagnosis Date Noted    Echogenic focus of heart of fetus affecting antepartum care of mother, fetus 1 2024     Overview Note:     3/4/2024: anatomy US today normal, except EIF noted, EFW 81%, AC 93%, YOUSIF nl, vertex.     PLAN: serial growth in 3rd trimester       Assessment & Plan Note:      noted      Anemia affecting pregnancy in second trimester 2024     Overview Note:     24:  Hgb 10.9 -- additional Fe       Assessment & Plan Note:     noted       Heart palpitations 2024    Drug use affecting pregnancy, first trimester 12/10/2023     Overview Note:     (+) THC at initial OBV -- recheck 3rd trimester     NEEDS CORD SEGMENT AT DELIVERY         Assessment & Plan Note:     noted      Multigravida in second trimester 2023     Overview Note:     EDC by LMP confirmed by 8 5/7 week US -- NULLIPAROUS    24:  NIPT low risk, MALE, CF, SMA, DMD, Fragile X all neg  24:  AFP only neg       Assessment & Plan Note:      Educated patient of signs and symptoms of  labor including but not limited to regular uterine contractions every 5-7 minutes for 1 hour, vaginal bleeding or leakage of fluid to seek immediate care.         Problem List Items Addressed This Visit       Multigravida in second trimester      Educated patient of signs and symptoms of  labor including but not limited to regular uterine contractions every 5-7 minutes for 1 hour, vaginal bleeding or leakage of fluid to seek immediate care.

## 2024-04-23 ENCOUNTER — ROUTINE PRENATAL (OUTPATIENT)
Dept: OBGYN CLINIC | Age: 24
End: 2024-04-23
Payer: COMMERCIAL

## 2024-04-23 ENCOUNTER — PROCEDURE VISIT (OUTPATIENT)
Dept: OBGYN CLINIC | Age: 24
End: 2024-04-23
Payer: COMMERCIAL

## 2024-04-23 VITALS
BODY MASS INDEX: 33.8 KG/M2 | HEIGHT: 64 IN | SYSTOLIC BLOOD PRESSURE: 110 MMHG | DIASTOLIC BLOOD PRESSURE: 66 MMHG | WEIGHT: 198 LBS

## 2024-04-23 DIAGNOSIS — O35.BXX1 ECHOGENIC FOCUS OF HEART OF FETUS AFFECTING ANTEPARTUM CARE OF MOTHER, FETUS 1: Primary | ICD-10-CM

## 2024-04-23 DIAGNOSIS — Z34.83 MULTIGRAVIDA IN THIRD TRIMESTER: ICD-10-CM

## 2024-04-23 DIAGNOSIS — O35.BXX1 ECHOGENIC FOCUS OF HEART OF FETUS AFFECTING ANTEPARTUM CARE OF MOTHER, FETUS 1: ICD-10-CM

## 2024-04-23 DIAGNOSIS — O99.013 ANEMIA AFFECTING PREGNANCY IN THIRD TRIMESTER: ICD-10-CM

## 2024-04-23 DIAGNOSIS — Z34.83 MULTIGRAVIDA IN THIRD TRIMESTER: Primary | ICD-10-CM

## 2024-04-23 DIAGNOSIS — O99.321 DRUG USE AFFECTING PREGNANCY, FIRST TRIMESTER: ICD-10-CM

## 2024-04-23 LAB
AMPHET UR QL SCN: NEGATIVE
BARBITURATES UR QL SCN: NEGATIVE
BASOPHILS # BLD: 0 K/UL (ref 0–0.2)
BASOPHILS NFR BLD: 0 % (ref 0–2)
BENZODIAZ UR QL: NEGATIVE
CANNABINOIDS UR QL SCN: NEGATIVE
COCAINE UR QL SCN: NEGATIVE
DIFFERENTIAL METHOD BLD: ABNORMAL
EOSINOPHIL # BLD: 0.1 K/UL (ref 0–0.8)
EOSINOPHIL NFR BLD: 1 % (ref 0.5–7.8)
ERYTHROCYTE [DISTWIDTH] IN BLOOD BY AUTOMATED COUNT: 12.7 % (ref 11.9–14.6)
GLUCOSE 1 HOUR: 146 MG/DL
HCT VFR BLD AUTO: 34.4 % (ref 35.8–46.3)
HGB BLD-MCNC: 11.1 G/DL (ref 11.7–15.4)
IMM GRANULOCYTES # BLD AUTO: 0.1 K/UL (ref 0–0.5)
IMM GRANULOCYTES NFR BLD AUTO: 1 % (ref 0–5)
LYMPHOCYTES # BLD: 1.7 K/UL (ref 0.5–4.6)
LYMPHOCYTES NFR BLD: 19 % (ref 13–44)
MCH RBC QN AUTO: 27.8 PG (ref 26.1–32.9)
MCHC RBC AUTO-ENTMCNC: 32.3 G/DL (ref 31.4–35)
MCV RBC AUTO: 86 FL (ref 82–102)
METHADONE UR QL: NEGATIVE
MONOCYTES # BLD: 0.3 K/UL (ref 0.1–1.3)
MONOCYTES NFR BLD: 4 % (ref 4–12)
NEUTS SEG # BLD: 6.6 K/UL (ref 1.7–8.2)
NEUTS SEG NFR BLD: 75 % (ref 43–78)
NRBC # BLD: 0 K/UL (ref 0–0.2)
OPIATES UR QL: NEGATIVE
PCP UR QL: NEGATIVE
PLATELET # BLD AUTO: 309 K/UL (ref 150–450)
PMV BLD AUTO: 9.6 FL (ref 9.4–12.3)
RBC # BLD AUTO: 4 M/UL (ref 4.05–5.2)
T PALLIDUM AB SER QL IA: NONREACTIVE
WBC # BLD AUTO: 8.8 K/UL (ref 4.3–11.1)

## 2024-04-23 PROCEDURE — 99213 OFFICE O/P EST LOW 20 MIN: CPT | Performed by: OBSTETRICS & GYNECOLOGY

## 2024-04-23 PROCEDURE — 76816 OB US FOLLOW-UP PER FETUS: CPT | Performed by: OBSTETRICS & GYNECOLOGY

## 2024-04-23 NOTE — PROGRESS NOTES
Patient comes in today for routine prenatal visit. No complaints/concerns today.     Finished Glucola at 9:47am     Fetal Movement: Yes  Contractions: No  Vaginal Bleeding: No  Leaking Fluid: No  GI/: No    Vitals:    04/23/24 1005   BP: 110/66   Site: Right Upper Arm   Position: Sitting   Weight: 89.8 kg (198 lb)   Height: 1.613 m (5' 3.5\")

## 2024-04-23 NOTE — PROGRESS NOTES
Chief Complaint   Patient presents with    Pregnancy Ultrasound    Routine Prenatal Visit        This 24 y.o.  at 28w2d with Estimated Date of Delivery: 24 presents for routine prenatal visit. Patient has no complaints today. Pt reports good FM, no LOF, VB, ctx. Pt denies H/A, vision changes, abdom pain, N/V.    Vitals:    24 1005   BP: 110/66   Site: Right Upper Arm   Position: Sitting   Weight: 89.8 kg (198 lb)   Height: 1.613 m (5' 3.5\")        Patient Active Problem List    Diagnosis Date Noted    Echogenic focus of heart of fetus affecting antepartum care of mother, fetus 1 2024     Overview Note:     3/4/2024: anatomy US today normal, except EIF noted, EFW 81%, AC 93%, YOUSIF nl, vertex.     PLAN: serial growth in 3rd trimester    24:  EFW 37%, AC 52%, YOUSIF 16.2 cm, vtx       Assessment & Plan Note:     noted      Anemia affecting pregnancy in third trimester 2024     Overview Note:     24:  Hgb 10.9 -- additional Fe       Assessment & Plan Note:     noted      Heart palpitations 2024    Drug use affecting pregnancy, first trimester 12/10/2023     Overview Note:     (+) THC at initial OBV -- recheck 3rd trimester     NEEDS CORD SEGMENT AT DELIVERY         Assessment & Plan Note:     noted      Multigravida in third trimester 2023     Overview Note:     EDC by LMP confirmed by 8 5/7 week US -- NULLIPAROUS    24:  NIPT low risk, MALE, CF, SMA, DMD, Fragile X all neg  24:  AFP only neg  24:  EFW 37%, AC 52%, YOUSIF 16.2 cm, vtx       Assessment & Plan Note:     Educated patient of signs and symptoms of  labor including but not limited to regular uterine contractions every 5-7 minutes for 1 hour, vaginal bleeding or leakage of fluid to seek immediate care.         Problem List Items Addressed This Visit              Multigravida in third trimester - Primary     Educated patient of signs and symptoms of  labor including but not limited to

## 2024-04-24 ENCOUNTER — TELEPHONE (OUTPATIENT)
Dept: OBGYN CLINIC | Age: 24
End: 2024-04-24

## 2024-04-24 DIAGNOSIS — O99.810 ABNORMAL GLUCOSE AFFECTING PREGNANCY: Primary | ICD-10-CM

## 2024-04-24 NOTE — TELEPHONE ENCOUNTER
----- Message from Yannick Sarabia MD sent at 4/24/2024  7:49 AM EDT -----  Please notify pt of failed glucola - needs diet and 3 hr GTT scheduled ASAP.

## 2024-04-24 NOTE — TELEPHONE ENCOUNTER
Patient informed of results and recommendations. Order placed. Card diet sent via My Chart. Patient scheduled for 04/30. Patient voiced understanding.

## 2024-04-30 DIAGNOSIS — O99.810 ABNORMAL GLUCOSE AFFECTING PREGNANCY: ICD-10-CM

## 2024-05-01 LAB
GESTATIONAL 3HR GTT: ABNORMAL
GLUCOSE 1H P 100 G GLC PO SERPL-MCNC: 193 MG/DL (ref 0–180)
GLUCOSE 2 HOUR: 127 MG/DL (ref 0–155)
GLUCOSE P FAST SERPL-MCNC: 90 MG/DL (ref 0–95)
GLUCOSE, 3 HOUR: 106 MG/DL (ref 0–140)

## 2024-05-02 ENCOUNTER — HOSPITAL ENCOUNTER (OUTPATIENT)
Age: 24
Discharge: HOME | End: 2024-05-02
Attending: EMERGENCY MEDICINE | Admitting: OBSTETRICS & GYNECOLOGY
Payer: COMMERCIAL

## 2024-05-02 VITALS
TEMPERATURE: 98.5 F | DIASTOLIC BLOOD PRESSURE: 65 MMHG | HEART RATE: 109 BPM | RESPIRATION RATE: 14 BRPM | HEIGHT: 63 IN | WEIGHT: 195 LBS | SYSTOLIC BLOOD PRESSURE: 116 MMHG | OXYGEN SATURATION: 98 % | BODY MASS INDEX: 34.55 KG/M2
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
## 2024-05-02 PROCEDURE — 59025 FETAL NON-STRESS TEST: CPT

## 2024-05-02 PROCEDURE — 6370000000 HC RX 637 (ALT 250 FOR IP): Performed by: OBSTETRICS & GYNECOLOGY

## 2024-05-02 PROCEDURE — 4500000002 HC ER NO CHARGE

## 2024-05-02 PROCEDURE — 99283 EMERGENCY DEPT VISIT LOW MDM: CPT

## 2024-05-02 RX ORDER — ACETAMINOPHEN 500 MG
1000 TABLET ORAL EVERY 6 HOURS PRN
Status: DISCONTINUED | OUTPATIENT
Start: 2024-05-02 | End: 2024-05-02 | Stop reason: HOSPADM

## 2024-05-02 RX ADMIN — ACETAMINOPHEN 1000 MG: 500 TABLET, FILM COATED ORAL at 18:30

## 2024-05-02 ASSESSMENT — PAIN - FUNCTIONAL ASSESSMENT: PAIN_FUNCTIONAL_ASSESSMENT: 0-10

## 2024-05-02 ASSESSMENT — PAIN SCALES - GENERAL: PAINLEVEL_OUTOF10: 6

## 2024-05-02 ASSESSMENT — LIFESTYLE VARIABLES
HOW OFTEN DO YOU HAVE A DRINK CONTAINING ALCOHOL: NEVER
HOW MANY STANDARD DRINKS CONTAINING ALCOHOL DO YOU HAVE ON A TYPICAL DAY: PATIENT DOES NOT DRINK
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
## 2024-05-02 NOTE — ED NOTES
Report given to garret LEDEZMA in L&D triage. Pt to be evaluated and cleared by L&D. Pt to be transported to 4th floor.

## 2024-05-07 ENCOUNTER — ROUTINE PRENATAL (OUTPATIENT)
Dept: OBGYN CLINIC | Age: 24
End: 2024-05-07
Payer: COMMERCIAL

## 2024-05-07 VITALS
BODY MASS INDEX: 33.6 KG/M2 | SYSTOLIC BLOOD PRESSURE: 110 MMHG | HEIGHT: 64 IN | DIASTOLIC BLOOD PRESSURE: 64 MMHG | WEIGHT: 196.8 LBS

## 2024-05-07 DIAGNOSIS — O99.013 ANEMIA AFFECTING PREGNANCY IN THIRD TRIMESTER: ICD-10-CM
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
## 2024-05-07 DIAGNOSIS — O99.321 DRUG USE AFFECTING PREGNANCY, FIRST TRIMESTER: ICD-10-CM

## 2024-05-07 DIAGNOSIS — O35.BXX1 ECHOGENIC FOCUS OF HEART OF FETUS AFFECTING ANTEPARTUM CARE OF MOTHER, FETUS 1: Primary | ICD-10-CM

## 2024-05-07 DIAGNOSIS — Z34.83 MULTIGRAVIDA IN THIRD TRIMESTER: ICD-10-CM

## 2024-05-07 DIAGNOSIS — O99.810 ABNORMAL GLUCOSE AFFECTING PREGNANCY: ICD-10-CM

## 2024-05-07 PROCEDURE — 99213 OFFICE O/P EST LOW 20 MIN: CPT | Performed by: OBSTETRICS & GYNECOLOGY
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
## 2024-05-20 ENCOUNTER — PROCEDURE VISIT (OUTPATIENT)
Dept: OBGYN CLINIC | Age: 24
End: 2024-05-20
Payer: COMMERCIAL

## 2024-05-20 ENCOUNTER — ROUTINE PRENATAL (OUTPATIENT)
Dept: OBGYN CLINIC | Age: 24
End: 2024-05-20
Payer: COMMERCIAL

## 2024-05-20 VITALS
OXYGEN SATURATION: 98 % | BODY MASS INDEX: 35.44 KG/M2 | WEIGHT: 200 LBS | HEIGHT: 63 IN | HEART RATE: 92 BPM | DIASTOLIC BLOOD PRESSURE: 72 MMHG | SYSTOLIC BLOOD PRESSURE: 108 MMHG

## 2024-05-20 DIAGNOSIS — O35.BXX1 ECHOGENIC FOCUS OF HEART OF FETUS AFFECTING ANTEPARTUM CARE OF MOTHER, FETUS 1: ICD-10-CM

## 2024-05-20 DIAGNOSIS — O35.BXX1 ECHOGENIC FOCUS OF HEART OF FETUS AFFECTING ANTEPARTUM CARE OF MOTHER, FETUS 1: Primary | ICD-10-CM

## 2024-05-20 DIAGNOSIS — O99.013 ANEMIA AFFECTING PREGNANCY IN THIRD TRIMESTER: ICD-10-CM

## 2024-05-20 DIAGNOSIS — O99.321 DRUG USE AFFECTING PREGNANCY, FIRST TRIMESTER: ICD-10-CM

## 2024-05-20 DIAGNOSIS — Z34.83 MULTIGRAVIDA IN THIRD TRIMESTER: ICD-10-CM

## 2024-05-20 DIAGNOSIS — Z34.83 MULTIGRAVIDA IN THIRD TRIMESTER: Primary | ICD-10-CM

## 2024-05-20 DIAGNOSIS — O99.810 ABNORMAL GLUCOSE AFFECTING PREGNANCY: ICD-10-CM

## 2024-05-20 LAB
ALBUMIN SERPL-MCNC: 2.8 G/DL (ref 3.5–5)
ALBUMIN/GLOB SERPL: 0.8 (ref 1–1.9)
ALP SERPL-CCNC: 126 U/L (ref 35–104)
ALT SERPL-CCNC: 21 U/L (ref 12–65)
ANION GAP SERPL CALC-SCNC: 15 MMOL/L (ref 9–18)
AST SERPL-CCNC: 24 U/L (ref 15–37)
BILIRUB SERPL-MCNC: 0.4 MG/DL (ref 0–1.2)
BUN SERPL-MCNC: 7 MG/DL (ref 6–23)
CALCIUM SERPL-MCNC: 9.5 MG/DL (ref 8.8–10.2)
CHLORIDE SERPL-SCNC: 102 MMOL/L (ref 98–107)
CO2 SERPL-SCNC: 18 MMOL/L (ref 20–28)
CREAT SERPL-MCNC: 0.55 MG/DL (ref 0.6–1.1)
CREAT UR-MCNC: 95.3 MG/DL (ref 28–217)
ERYTHROCYTE [DISTWIDTH] IN BLOOD BY AUTOMATED COUNT: 12.9 % (ref 11.9–14.6)
FERRITIN SERPL-MCNC: 28 NG/ML (ref 8–388)
FOLATE SERPL-MCNC: 24.4 NG/ML (ref 3.1–17.5)
GLOBULIN SER CALC-MCNC: 3.6 G/DL (ref 2.3–3.5)
GLUCOSE SERPL-MCNC: 89 MG/DL (ref 70–99)
HCT VFR BLD AUTO: 36.7 % (ref 35.8–46.3)
HGB BLD-MCNC: 11.9 G/DL (ref 11.7–15.4)
HGB RETIC QN AUTO: 32 PG (ref 29–35)
IMM RETICS NFR: 18.8 % (ref 3–15.9)
IRON SATN MFR SERPL: 14 % (ref 20–50)
IRON SERPL-MCNC: 76 UG/DL (ref 35–100)
LDH SERPL L TO P-CCNC: 202 U/L (ref 127–281)
MAGNESIUM SERPL-MCNC: 1.5 MG/DL (ref 1.8–2.4)
MCH RBC QN AUTO: 27.8 PG (ref 26.1–32.9)
MCHC RBC AUTO-ENTMCNC: 32.4 G/DL (ref 31.4–35)
MCV RBC AUTO: 85.7 FL (ref 82–102)
NRBC # BLD: 0 K/UL (ref 0–0.2)
PLATELET # BLD AUTO: 306 K/UL (ref 150–450)
PMV BLD AUTO: 9.9 FL (ref 9.4–12.3)
POTASSIUM SERPL-SCNC: 4 MMOL/L (ref 3.5–5.1)
PROT SERPL-MCNC: 6.4 G/DL (ref 6.3–8.2)
PROT UR-MCNC: 9 MG/DL
PROT/CREAT UR-RTO: 0.1
RBC # BLD AUTO: 4.28 M/UL (ref 4.05–5.2)
RETICS # AUTO: 0.12 M/UL (ref 0.03–0.1)
RETICS/RBC NFR AUTO: 2.7 % (ref 0.3–2)
SODIUM SERPL-SCNC: 135 MMOL/L (ref 136–145)
TIBC SERPL-MCNC: 548 UG/DL (ref 240–450)
TSH W FREE THYROID IF ABNORMAL: 1.48 UIU/ML (ref 0.27–4.2)
UIBC SERPL-MCNC: 472 UG/DL (ref 112–347)
URATE SERPL-MCNC: 5.5 MG/DL (ref 2.5–7.1)
VIT B12 SERPL-MCNC: 427 PG/ML (ref 193–986)
WBC # BLD AUTO: 8.1 K/UL (ref 4.3–11.1)

## 2024-05-20 PROCEDURE — 99213 OFFICE O/P EST LOW 20 MIN: CPT | Performed by: NURSE PRACTITIONER

## 2024-05-20 PROCEDURE — 76816 OB US FOLLOW-UP PER FETUS: CPT | Performed by: OBSTETRICS & GYNECOLOGY

## 2024-05-20 NOTE — PROGRESS NOTES
I have reviewed the patient's visit today including history, exam and assessment by TERESA Álvarez.  I agree with treatment/plan as above.    Yannick Sarabia MD  9:54 AM  05/20/24   
Patient comes in today for routine prenatal visit. Pt states she has been getting lightheaded and dizzy and has headaches. Pt states she is not taking Tylenol or trying medication when she has headaches. Pt states she has SOB and sees stars sometimes.     Fetal Movement: Yes  Contractions: No  Vaginal Bleeding: No  Leaking Fluid: No  GI/: No    Vitals:    05/20/24 0924   BP: 108/72   Site: Right Upper Arm   Position: Sitting   Pulse: 92   SpO2: 98%   Weight: 90.7 kg (200 lb)   Height: 1.6 m (5' 3\")        
pregnancy in third trimester 02/11/2024     Overview Note:     2/9/24:  Hgb 10.9 -- additional Fe       Assessment & Plan Note:      noted      Heart palpitations 02/05/2024    Drug use affecting pregnancy, first trimester 12/10/2023     Overview Note:     (+) THC at initial OBV -- recheck 3rd trimester     NEEDS CORD SEGMENT AT DELIVERY    4/23/24:  negative       Assessment & Plan Note:      noted      Multigravida in third trimester 12/08/2023     Overview Note:     EDC by LMP confirmed by 8 5/7 week US -- NULLIPAROUS    1/5/24:  NIPT low risk, MALE, CF, SMA, DMD, Fragile X all neg  2/5/24:  AFP only neg  4/23/24:  EFW 37%, AC 52%, YOUSIF 16.2 cm, vtx  5/20/2024: EFW 28%, AC 39%, YOUSIF nl, vertex, EIF not visualized due to fetal position       Assessment & Plan Note:      PTL/labor precautions, FMC, and pregnancy warning signs reviewed. Pt advised to call the office at [phone] or go straight to Labor and Delivery at [facility], [place] with any of the following concerns vaginal bleeding, leaking of fluid, steve regularly Q 5-7 minutes for over an hour or not feeling the baby move.   RTO 2 weeks OBV    Pt c/o intermittent SOB, dizziness, vision changes at work. Does not happen all of the time. Working 40 hours/week. 8 hours/day. Not always eating meal before work. No symptoms today. VSS. Exam nl.  Will check labs today. We discuss if worsening of symptoms to go to ER. We discuss hydration recommendation again as well as calorie recommendations. Advised to eat before work shift. Continue to take breaks as she is. If symptoms worsen let us know         Problem List Items Addressed This Visit          Circulatory    Echogenic focus of heart of fetus affecting antepartum care of mother, fetus 1     noted             Other    Multigravida in third trimester - Primary      PTL/labor precautions, FMC, and pregnancy warning signs reviewed. Pt advised to call the office at [phone] or go straight to Labor and

## 2024-05-20 NOTE — ASSESSMENT & PLAN NOTE
PTL/labor precautions, FMC, and pregnancy warning signs reviewed. Pt advised to call the office at [phone] or go straight to Labor and Delivery at [facility] with any of the following concerns vaginal bleeding, leaking of fluid, steve regularly Q 5-7 minutes for over an hour or not feeling the baby move.   RTO 2 weeks OBV    Pt c/o intermittent SOB, dizziness, vision changes at work. Does not happen all of the time. Working 40 hours/week. 8 hours/day. Not always eating meal before work. No symptoms today. VSS. Exam nl.  Will check labs today. We discuss if worsening of symptoms to go to ER. We discuss hydration recommendation again as well as calorie recommendations. Advised to eat before work shift. Continue to take breaks as she is. If symptoms worsen let us know

## 2024-05-21 ENCOUNTER — TELEPHONE (OUTPATIENT)
Dept: OBGYN CLINIC | Age: 24
End: 2024-05-21

## 2024-05-21 NOTE — TELEPHONE ENCOUNTER
Please let pt know labs are normal except magnesium levels slightly low.    She can take an OTC magnesium supplement. If she looks in her prenatal booklet on page 10, magnesium is listed there. She can take 400-500 mg daily. Take at bedtime as it can cause drowsiness

## 2024-05-21 NOTE — TELEPHONE ENCOUNTER
Pt read [employer] message, no response so encounter closed.     Last read by Sara España at  8:54 AM on 5/21/2024

## 2024-06-03 ENCOUNTER — ROUTINE PRENATAL (OUTPATIENT)
Dept: OBGYN CLINIC | Age: 24
End: 2024-06-03
Payer: COMMERCIAL

## 2024-06-03 VITALS
WEIGHT: 205 LBS | SYSTOLIC BLOOD PRESSURE: 122 MMHG | DIASTOLIC BLOOD PRESSURE: 84 MMHG | BODY MASS INDEX: 36.32 KG/M2 | HEIGHT: 63 IN

## 2024-06-03 DIAGNOSIS — Z34.83 MULTIGRAVIDA IN THIRD TRIMESTER: Primary | ICD-10-CM

## 2024-06-03 DIAGNOSIS — O99.810 ABNORMAL GLUCOSE AFFECTING PREGNANCY: ICD-10-CM

## 2024-06-03 DIAGNOSIS — O99.321 DRUG USE AFFECTING PREGNANCY, FIRST TRIMESTER: ICD-10-CM

## 2024-06-03 DIAGNOSIS — O35.BXX1 ECHOGENIC FOCUS OF HEART OF FETUS AFFECTING ANTEPARTUM CARE OF MOTHER, FETUS 1: ICD-10-CM

## 2024-06-03 DIAGNOSIS — O99.013 ANEMIA AFFECTING PREGNANCY IN THIRD TRIMESTER: ICD-10-CM

## 2024-06-03 PROCEDURE — 99213 OFFICE O/P EST LOW 20 MIN: CPT | Performed by: OBSTETRICS & GYNECOLOGY

## 2024-06-03 NOTE — PROGRESS NOTES
Patient comes in today for routine prenatal visit. No complaints/concerns today.     Fetal Movement: Yes  Contractions: Yes BH   Vaginal Bleeding: No  Leaking Fluid: No  GI/: No    Vitals:    06/03/24 0959   BP: 122/84   Site: Left Upper Arm   Position: Sitting   Weight: 93 kg (205 lb)   Height: 1.6 m (5' 3\")

## 2024-06-03 NOTE — PROGRESS NOTES
Chief Complaint   Patient presents with    Routine Prenatal Visit        This 24 y.o.  at 34w1d with Estimated Date of Delivery: 24 presents for routine prenatal visit. Patient has no complaints today. Pt reports good FM, no LOF, VB, ctx. Pt denies H/A, vision changes, abdom pain, N/V.    Vitals:    24 0959   BP: 122/84   Site: Left Upper Arm   Position: Sitting   Weight: 93 kg (205 lb)   Height: 1.6 m (5' 3\")        Patient Active Problem List    Diagnosis Date Noted    Abnormal glucose affecting pregnancy 2024     Overview Note:     Failed glucola - 146, passed 3 hr GTT       Assessment & Plan Note:     noted      Echogenic focus of heart of fetus affecting antepartum care of mother, fetus 1 2024     Overview Note:     3/4/2024: anatomy US today normal, except EIF noted, EFW 81%, AC 93%, YOUSIF nl, vertex.     PLAN: serial growth in 3rd trimester    24:  EFW 37%, AC 52%, YOUSIF 16.2 cm, vtx  2024: EFW 28%, AC 39%, YOUSIF nl, vertex, EIF not visualized due to fetal position       Assessment & Plan Note:     noted      Anemia affecting pregnancy in third trimester 2024     Overview Note:     24:  Hgb 10.9 -- additional Fe       Assessment & Plan Note:     Recheck next visit      Heart palpitations 2024    Drug use affecting pregnancy, first trimester 12/10/2023     Overview Note:     (+) THC at initial OBV -- recheck 3rd trimester     NEEDS CORD SEGMENT AT DELIVERY    24:  negative       Assessment & Plan Note:     noted      Multigravida in third trimester 2023     Overview Note:     EDC by LMP confirmed by 8 5/7 week US -- NULLIPAROUS    24:  NIPT low risk, MALE, CF, SMA, DMD, Fragile X all neg  24:  AFP only neg  24:  EFW 37%, AC 52%, YOUSIF 16.2 cm, vtx  2024: EFW 28%, AC 39%, YOUSIF nl, vertex, EIF not visualized due to fetal position       Assessment & Plan Note:     Educated patient of signs and symptoms of  labor including but not

## 2024-06-17 ENCOUNTER — PROCEDURE VISIT (OUTPATIENT)
Dept: OBGYN CLINIC | Age: 24
End: 2024-06-17
Payer: COMMERCIAL

## 2024-06-17 ENCOUNTER — ROUTINE PRENATAL (OUTPATIENT)
Dept: OBGYN CLINIC | Age: 24
End: 2024-06-17
Payer: COMMERCIAL

## 2024-06-17 VITALS
HEIGHT: 63 IN | WEIGHT: 208 LBS | SYSTOLIC BLOOD PRESSURE: 110 MMHG | BODY MASS INDEX: 36.86 KG/M2 | DIASTOLIC BLOOD PRESSURE: 72 MMHG

## 2024-06-17 DIAGNOSIS — O35.BXX1 ECHOGENIC FOCUS OF HEART OF FETUS AFFECTING ANTEPARTUM CARE OF MOTHER, FETUS 1: Primary | ICD-10-CM

## 2024-06-17 DIAGNOSIS — Z34.83 MULTIGRAVIDA IN THIRD TRIMESTER: ICD-10-CM

## 2024-06-17 DIAGNOSIS — O99.013 ANEMIA AFFECTING PREGNANCY IN THIRD TRIMESTER: ICD-10-CM

## 2024-06-17 DIAGNOSIS — O99.321 DRUG USE AFFECTING PREGNANCY, FIRST TRIMESTER: ICD-10-CM

## 2024-06-17 DIAGNOSIS — O35.BXX1 ECHOGENIC FOCUS OF HEART OF FETUS AFFECTING ANTEPARTUM CARE OF MOTHER, FETUS 1: ICD-10-CM

## 2024-06-17 DIAGNOSIS — O99.810 ABNORMAL GLUCOSE AFFECTING PREGNANCY: ICD-10-CM

## 2024-06-17 DIAGNOSIS — Z34.83 MULTIGRAVIDA IN THIRD TRIMESTER: Primary | ICD-10-CM

## 2024-06-17 PROBLEM — R00.2 HEART PALPITATIONS: Status: RESOLVED | Noted: 2024-02-05 | Resolved: 2024-06-17

## 2024-06-17 LAB
BASOPHILS # BLD: 0 K/UL (ref 0–0.2)
BASOPHILS NFR BLD: 0 % (ref 0–2)
DIFFERENTIAL METHOD BLD: NORMAL
EOSINOPHIL # BLD: 0.1 K/UL (ref 0–0.8)
EOSINOPHIL NFR BLD: 1 % (ref 0.5–7.8)
ERYTHROCYTE [DISTWIDTH] IN BLOOD BY AUTOMATED COUNT: 13.2 % (ref 11.9–14.6)
HCT VFR BLD AUTO: 36.9 % (ref 35.8–46.3)
HGB BLD-MCNC: 11.9 G/DL (ref 11.7–15.4)
IMM GRANULOCYTES # BLD AUTO: 0.1 K/UL (ref 0–0.5)
IMM GRANULOCYTES NFR BLD AUTO: 1 % (ref 0–5)
LYMPHOCYTES # BLD: 2.1 K/UL (ref 0.5–4.6)
LYMPHOCYTES NFR BLD: 24 % (ref 13–44)
MCH RBC QN AUTO: 27.7 PG (ref 26.1–32.9)
MCHC RBC AUTO-ENTMCNC: 32.2 G/DL (ref 31.4–35)
MCV RBC AUTO: 86 FL (ref 82–102)
MONOCYTES # BLD: 0.8 K/UL (ref 0.1–1.3)
MONOCYTES NFR BLD: 9 % (ref 4–12)
NEUTS SEG # BLD: 5.8 K/UL (ref 1.7–8.2)
NEUTS SEG NFR BLD: 65 % (ref 43–78)
NRBC # BLD: 0 K/UL (ref 0–0.2)
PLATELET # BLD AUTO: 328 K/UL (ref 150–450)
PMV BLD AUTO: 10.2 FL (ref 9.4–12.3)
RBC # BLD AUTO: 4.29 M/UL (ref 4.05–5.2)
WBC # BLD AUTO: 8.8 K/UL (ref 4.3–11.1)

## 2024-06-17 PROCEDURE — 99213 OFFICE O/P EST LOW 20 MIN: CPT | Performed by: OBSTETRICS & GYNECOLOGY

## 2024-06-17 PROCEDURE — 76816 OB US FOLLOW-UP PER FETUS: CPT | Performed by: OBSTETRICS & GYNECOLOGY

## 2024-06-17 RX ORDER — FAMOTIDINE 20 MG/1
20 TABLET, FILM COATED ORAL 2 TIMES DAILY
Qty: 60 TABLET | Refills: 6 | Status: SHIPPED | OUTPATIENT
Start: 2024-06-17

## 2024-06-17 NOTE — PROGRESS NOTES
Patient comes in today for routine prenatal visit. Pt states she has been feeling lightheaded, states she is staying hydrated and eating small snacks throughout the day.     Fetal Movement: Yes  Contractions: Yes BH  Vaginal Bleeding: No  Leaking Fluid: No  GI/: No    Vitals:    06/17/24 1133   BP: 110/72   Site: Right Upper Arm   Position: Sitting   Weight: 94.3 kg (208 lb)   Height: 1.6 m (5' 3\")        
every 5-7 minutes for 1 hour, vaginal bleeding or leakage of fluid to seek immediate care.         Problem List Items Addressed This Visit              Multigravida in third trimester - Primary     Educated patient of signs and symptoms of  labor including but not limited to regular uterine contractions every 5-7 minutes for 1 hour, vaginal bleeding or leakage of fluid to seek immediate care.          Relevant Orders    CBC with Auto Differential    Culture, Strep B Screen, Vaginal/Rectal       Other    Echogenic focus of heart of fetus affecting antepartum care of mother, fetus 1    Drug use affecting pregnancy, first trimester     noted         Anemia affecting pregnancy in third trimester     noted         Relevant Orders    CBC with Auto Differential    Abnormal glucose affecting pregnancy     noted             Yannick Sarabia MD   11:40 AM  24

## 2024-06-17 NOTE — PATIENT INSTRUCTIONS
Please try to increase the amount of water you are drinking daily  If you develop signs and symptoms of  labor including but not limited to regular uterine contractions every 5-7 minutes for 1 hour, vaginal bleeding or leakage of fluid please contact our office and/or seek immediate care.  Thanks for coming to see us today and letting us take care of you!

## 2024-06-21 LAB
BACTERIA SPEC CULT: NORMAL
SERVICE CMNT-IMP: NORMAL

## 2024-06-25 ENCOUNTER — ROUTINE PRENATAL (OUTPATIENT)
Dept: OBGYN CLINIC | Age: 24
End: 2024-06-25
Payer: COMMERCIAL

## 2024-06-25 ENCOUNTER — PROCEDURE VISIT (OUTPATIENT)
Dept: OBGYN CLINIC | Age: 24
End: 2024-06-25
Payer: COMMERCIAL

## 2024-06-25 VITALS
BODY MASS INDEX: 37.03 KG/M2 | DIASTOLIC BLOOD PRESSURE: 74 MMHG | HEIGHT: 63 IN | SYSTOLIC BLOOD PRESSURE: 116 MMHG | WEIGHT: 209 LBS

## 2024-06-25 DIAGNOSIS — Z34.83 MULTIGRAVIDA IN THIRD TRIMESTER: ICD-10-CM

## 2024-06-25 DIAGNOSIS — O35.BXX1 ECHOGENIC FOCUS OF HEART OF FETUS AFFECTING ANTEPARTUM CARE OF MOTHER, FETUS 1: Primary | ICD-10-CM

## 2024-06-25 DIAGNOSIS — O99.321 DRUG USE AFFECTING PREGNANCY, FIRST TRIMESTER: ICD-10-CM

## 2024-06-25 DIAGNOSIS — O99.810 ABNORMAL GLUCOSE AFFECTING PREGNANCY: ICD-10-CM

## 2024-06-25 DIAGNOSIS — O99.013 ANEMIA AFFECTING PREGNANCY IN THIRD TRIMESTER: ICD-10-CM

## 2024-06-25 PROCEDURE — 99213 OFFICE O/P EST LOW 20 MIN: CPT | Performed by: NURSE PRACTITIONER

## 2024-06-25 PROCEDURE — 76819 FETAL BIOPHYS PROFIL W/O NST: CPT | Performed by: OBSTETRICS & GYNECOLOGY

## 2024-06-25 NOTE — ASSESSMENT & PLAN NOTE
PTL/labor precautions, FMC, and pregnancy warning signs reviewed. Pt advised to call the office at [phone] or go straight to Labor and Delivery at [facility], [place] with any of the following concerns vaginal bleeding, leaking of fluid, steve regularly Q 5-7 minutes for over an hour or not feeling the baby move.   Rto 1 wk

## 2024-06-25 NOTE — PROGRESS NOTES
I have reviewed the patient's visit today including history, exam and assessment by TERESA Álvarez.  I agree with treatment/plan as above.    Yannick Sarabia MD  11:16 AM  06/25/24

## 2024-06-25 NOTE — PROGRESS NOTES
This is a 24 y.o.   at 37w2d for routine OB visit.    Her Estimated Due Date is 2024, by Ultrasound    Denies leaking of fluid, vaginal bleeding, or regular contractions. Reports fetal movement.     Current Outpatient Medications on File Prior to Visit   Medication Sig Dispense Refill    famotidine (PEPCID) 20 MG tablet Take 1 tablet by mouth 2 times daily (Patient taking differently: Take 1 tablet by mouth 2 times daily as needed) 60 tablet 6    MAGNESIUM PO Take by mouth      ferrous sulfate (IRON 325) 325 (65 Fe) MG tablet Take 1 tablet by mouth 2 times daily 60 tablet 6    Prenatal w/o A Vit-Fe Fum-FA (PRENATA PO) Take by mouth       No current facility-administered medications on file prior to visit.       Allergies   Allergen Reactions    Latex Itching and Rash    Peanut Allergen Powder-Dnfp Anaphylaxis       OB History    Para Term  AB Living   2 0 0 0 1 0   SAB IAB Ectopic Molar Multiple Live Births   0 1 0 0 0 0       # 1 - Date: , Sex: None, Weight: None, GA: None, Delivery: None, Apgar1: None, Apgar5: None, Living: None, Birth Comments: None    # 2 - Date: None, Sex: None, Weight: None, GA: None, Delivery: None, Apgar1: None, Apgar5: None, Living: None, Birth Comments: None        Past Medical History:   Diagnosis Date    Migraines        Past Surgical History:   Procedure Laterality Date    ACHILLES TENDON SURGERY         Family History   Problem Relation Age of Onset    Breast Cancer Maternal Aunt     Diabetes Father     Colon Cancer Neg Hx     Ovarian Cancer Neg Hx     Uterine Cancer Neg Hx        Social History     Socioeconomic History    Marital status: Single     Spouse name: Not on file    Number of children: Not on file    Years of education: Not on file    Highest education level: Not on file   Occupational History    Not on file   Tobacco Use    Smoking status: Never    Smokeless tobacco: Never   Vaping Use    Vaping Use: Former    Start date: 2019    Quit

## 2024-06-25 NOTE — PROGRESS NOTES
Patient comes in today for routine prenatal visit. No complaints/concerns today.     Fetal Movement: Yes  Contractions: Yes-BH  Vaginal Bleeding: No  Leaking Fluid: No  GI/: No    Vitals:    06/25/24 1054   BP: 116/74   Site: Right Upper Arm   Position: Sitting   Weight: 94.8 kg (209 lb)   Height: 1.6 m (5' 3\")

## 2024-07-01 ENCOUNTER — ROUTINE PRENATAL (OUTPATIENT)
Dept: OBGYN CLINIC | Age: 24
End: 2024-07-01
Payer: COMMERCIAL

## 2024-07-01 ENCOUNTER — PROCEDURE VISIT (OUTPATIENT)
Dept: OBGYN CLINIC | Age: 24
End: 2024-07-01
Payer: COMMERCIAL

## 2024-07-01 VITALS
DIASTOLIC BLOOD PRESSURE: 76 MMHG | WEIGHT: 209.8 LBS | HEIGHT: 63 IN | SYSTOLIC BLOOD PRESSURE: 118 MMHG | BODY MASS INDEX: 37.17 KG/M2

## 2024-07-01 DIAGNOSIS — O36.8130 DECREASED FETAL MOVEMENTS IN THIRD TRIMESTER, SINGLE OR UNSPECIFIED FETUS: Primary | ICD-10-CM

## 2024-07-01 DIAGNOSIS — N89.8 VAGINAL DISCHARGE DURING PREGNANCY IN THIRD TRIMESTER: Primary | ICD-10-CM

## 2024-07-01 DIAGNOSIS — O99.810 ABNORMAL GLUCOSE AFFECTING PREGNANCY: ICD-10-CM

## 2024-07-01 DIAGNOSIS — O26.893 VAGINAL DISCHARGE DURING PREGNANCY IN THIRD TRIMESTER: Primary | ICD-10-CM

## 2024-07-01 DIAGNOSIS — Z34.83 MULTIGRAVIDA IN THIRD TRIMESTER: ICD-10-CM

## 2024-07-01 DIAGNOSIS — O99.013 ANEMIA AFFECTING PREGNANCY IN THIRD TRIMESTER: ICD-10-CM

## 2024-07-01 DIAGNOSIS — O99.321 DRUG USE AFFECTING PREGNANCY, FIRST TRIMESTER: ICD-10-CM

## 2024-07-01 PROCEDURE — 76819 FETAL BIOPHYS PROFIL W/O NST: CPT | Performed by: OBSTETRICS & GYNECOLOGY

## 2024-07-01 PROCEDURE — 99213 OFFICE O/P EST LOW 20 MIN: CPT | Performed by: NURSE PRACTITIONER

## 2024-07-01 NOTE — PROGRESS NOTES
Chaperone for Intimate Exam     Chaperone was offer accepted as part of the rooming process    Chaperone: No Espino     
Patient comes in today for routine prenatal visit.      Fetal Movement: Yes-patient states since her visit last Tuesday she has felt baby move less. Only feels baby move when she moves her \"belly\" around. Paitent is unsure of kick counts.  Contractions: Yes-  Vaginal Bleeding: No  Leaking Fluid: Yes-patient states that 2 days ago she felt leakage of fluid and it is still ongoing today.   GI/: No    Vitals:    07/01/24 0920   BP: 118/76   Site: Left Upper Arm   Position: Sitting   Weight: 95.2 kg (209 lb 12.8 oz)   Height: 1.6 m (5' 3\")        
sulfate (IRON 325) 325 (65 Fe) MG tablet Take 1 tablet by mouth 2 times daily 60 tablet 6    Prenatal w/o A Vit-Fe Fum-FA (PRENATA PO) Take by mouth       No facility-administered encounter medications on file as of 7/1/2024.               Labor signs, pregnancy warning signs, and fetal movement counting reviewed (if applicable)        ROSA Weems - CNP 07/01/24 12:14 PM

## 2024-07-01 NOTE — ASSESSMENT & PLAN NOTE
C/o decreased fetal movement and ?SROM  Nitrazine negative. YOUSIF nl 17 cm. Nuswba collected, negative pooling  BPP 8/8, movement appreciated by myself with SVE  Strict FMCs reviewed with pt

## 2024-07-02 ENCOUNTER — HOSPITAL ENCOUNTER (INPATIENT)
Age: 24
LOS: 2 days | Discharge: HOME | DRG: 560 | End: 2024-07-04
Attending: OBSTETRICS & GYNECOLOGY | Admitting: OBSTETRICS & GYNECOLOGY
Payer: COMMERCIAL

## 2024-07-02 PROBLEM — Z37.9 NORMAL LABOR: Status: ACTIVE | Noted: 2024-07-02

## 2024-07-02 LAB
ALBUMIN SERPL-MCNC: 2.9 G/DL (ref 3.5–5)
ALBUMIN/GLOB SERPL: 0.7 (ref 1–1.9)
ALP SERPL-CCNC: 182 U/L (ref 35–104)
ALT SERPL-CCNC: 21 U/L (ref 12–65)
AMNISURE, POC: POSITIVE
ANION GAP SERPL CALC-SCNC: 14 MMOL/L (ref 9–18)
AST SERPL-CCNC: 30 U/L (ref 15–37)
BILIRUB SERPL-MCNC: 0.4 MG/DL (ref 0–1.2)
BUN SERPL-MCNC: 8 MG/DL (ref 6–23)
CALCIUM SERPL-MCNC: 10.1 MG/DL (ref 8.8–10.2)
CHLORIDE SERPL-SCNC: 103 MMOL/L (ref 98–107)
CO2 SERPL-SCNC: 17 MMOL/L (ref 20–28)
CREAT SERPL-MCNC: 0.69 MG/DL (ref 0.6–1.1)
ERYTHROCYTE [DISTWIDTH] IN BLOOD BY AUTOMATED COUNT: 13.2 % (ref 11.9–14.6)
GLOBULIN SER CALC-MCNC: 3.9 G/DL (ref 2.3–3.5)
GLUCOSE SERPL-MCNC: 75 MG/DL (ref 70–99)
HCT VFR BLD AUTO: 38.6 % (ref 35.8–46.3)
HGB BLD-MCNC: 12.9 G/DL (ref 11.7–15.4)
LDH SERPL L TO P-CCNC: 285 U/L (ref 127–281)
Lab: NORMAL
MCH RBC QN AUTO: 27.4 PG (ref 26.1–32.9)
MCHC RBC AUTO-ENTMCNC: 33.4 G/DL (ref 31.4–35)
MCV RBC AUTO: 82.1 FL (ref 82–102)
NEGATIVE QC PASS/FAIL: NORMAL
NRBC # BLD: 0 K/UL (ref 0–0.2)
PLATELET # BLD AUTO: 346 K/UL (ref 150–450)
PMV BLD AUTO: 10 FL (ref 9.4–12.3)
POSITIVE QC PASS/FAIL: NORMAL
POTASSIUM SERPL-SCNC: 4.6 MMOL/L (ref 3.5–5.1)
PROT SERPL-MCNC: 6.8 G/DL (ref 6.3–8.2)
RBC # BLD AUTO: 4.7 M/UL (ref 4.05–5.2)
SODIUM SERPL-SCNC: 134 MMOL/L (ref 136–145)
URATE SERPL-MCNC: 6.6 MG/DL (ref 2.5–7.1)
WBC # BLD AUTO: 9.4 K/UL (ref 4.3–11.1)

## 2024-07-02 PROCEDURE — 4500000002 HC ER NO CHARGE

## 2024-07-02 PROCEDURE — 85027 COMPLETE CBC AUTOMATED: CPT

## 2024-07-02 PROCEDURE — 86900 BLOOD TYPING SEROLOGIC ABO: CPT

## 2024-07-02 PROCEDURE — 83615 LACTATE (LD) (LDH) ENZYME: CPT

## 2024-07-02 PROCEDURE — 84550 ASSAY OF BLOOD/URIC ACID: CPT

## 2024-07-02 PROCEDURE — 86850 RBC ANTIBODY SCREEN: CPT

## 2024-07-02 PROCEDURE — 82570 ASSAY OF URINE CREATININE: CPT

## 2024-07-02 PROCEDURE — 1100000000 HC RM PRIVATE

## 2024-07-02 PROCEDURE — 84156 ASSAY OF PROTEIN URINE: CPT

## 2024-07-02 PROCEDURE — 86901 BLOOD TYPING SEROLOGIC RH(D): CPT

## 2024-07-02 PROCEDURE — 84112 EVAL AMNIOTIC FLUID PROTEIN: CPT

## 2024-07-02 PROCEDURE — 86780 TREPONEMA PALLIDUM: CPT

## 2024-07-02 PROCEDURE — 80053 COMPREHEN METABOLIC PANEL: CPT

## 2024-07-02 PROCEDURE — 59025 FETAL NON-STRESS TEST: CPT

## 2024-07-02 PROCEDURE — 99285 EMERGENCY DEPT VISIT HI MDM: CPT

## 2024-07-02 RX ORDER — METHYLERGONOVINE MALEATE 0.2 MG/ML
200 INJECTION INTRAVENOUS PRN
Status: DISCONTINUED | OUTPATIENT
Start: 2024-07-02 | End: 2024-07-03

## 2024-07-02 RX ORDER — SODIUM CHLORIDE, SODIUM LACTATE, POTASSIUM CHLORIDE, AND CALCIUM CHLORIDE .6; .31; .03; .02 G/100ML; G/100ML; G/100ML; G/100ML
1000 INJECTION, SOLUTION INTRAVENOUS PRN
Status: DISCONTINUED | OUTPATIENT
Start: 2024-07-02 | End: 2024-07-03

## 2024-07-02 RX ORDER — DEXTROSE, SODIUM CHLORIDE, SODIUM LACTATE, POTASSIUM CHLORIDE, AND CALCIUM CHLORIDE 5; .6; .31; .03; .02 G/100ML; G/100ML; G/100ML; G/100ML; G/100ML
INJECTION, SOLUTION INTRAVENOUS CONTINUOUS
Status: DISCONTINUED | OUTPATIENT
Start: 2024-07-02 | End: 2024-07-03

## 2024-07-02 RX ORDER — MORPHINE SULFATE 2 MG/ML
2 INJECTION, SOLUTION INTRAMUSCULAR; INTRAVENOUS
Status: COMPLETED | OUTPATIENT
Start: 2024-07-02 | End: 2024-07-03

## 2024-07-02 RX ORDER — TERBUTALINE SULFATE 1 MG/ML
0.25 INJECTION, SOLUTION SUBCUTANEOUS ONCE
Status: DISCONTINUED | OUTPATIENT
Start: 2024-07-02 | End: 2024-07-03

## 2024-07-02 RX ORDER — SODIUM CHLORIDE, SODIUM LACTATE, POTASSIUM CHLORIDE, AND CALCIUM CHLORIDE .6; .31; .03; .02 G/100ML; G/100ML; G/100ML; G/100ML
500 INJECTION, SOLUTION INTRAVENOUS PRN
Status: DISCONTINUED | OUTPATIENT
Start: 2024-07-02 | End: 2024-07-03

## 2024-07-02 RX ORDER — SODIUM CHLORIDE 0.9 % (FLUSH) 0.9 %
5-40 SYRINGE (ML) INJECTION EVERY 12 HOURS SCHEDULED
Status: DISCONTINUED | OUTPATIENT
Start: 2024-07-02 | End: 2024-07-03

## 2024-07-02 RX ORDER — MISOPROSTOL 200 UG/1
400 TABLET ORAL PRN
Status: DISCONTINUED | OUTPATIENT
Start: 2024-07-02 | End: 2024-07-03

## 2024-07-02 RX ORDER — ACETAMINOPHEN 325 MG/1
650 TABLET ORAL EVERY 4 HOURS PRN
Status: DISCONTINUED | OUTPATIENT
Start: 2024-07-02 | End: 2024-07-03

## 2024-07-02 RX ORDER — SODIUM CHLORIDE 0.9 % (FLUSH) 0.9 %
5-40 SYRINGE (ML) INJECTION PRN
Status: DISCONTINUED | OUTPATIENT
Start: 2024-07-02 | End: 2024-07-03

## 2024-07-02 RX ORDER — SODIUM CHLORIDE 9 MG/ML
INJECTION, SOLUTION INTRAVENOUS PRN
Status: DISCONTINUED | OUTPATIENT
Start: 2024-07-02 | End: 2024-07-03

## 2024-07-02 RX ORDER — ONDANSETRON 4 MG/1
4 TABLET, ORALLY DISINTEGRATING ORAL EVERY 6 HOURS PRN
Status: DISCONTINUED | OUTPATIENT
Start: 2024-07-02 | End: 2024-07-03

## 2024-07-02 RX ORDER — ONDANSETRON 2 MG/ML
4 INJECTION INTRAMUSCULAR; INTRAVENOUS EVERY 6 HOURS PRN
Status: DISCONTINUED | OUTPATIENT
Start: 2024-07-02 | End: 2024-07-03

## 2024-07-02 RX ORDER — CARBOPROST TROMETHAMINE 250 UG/ML
250 INJECTION, SOLUTION INTRAMUSCULAR PRN
Status: DISCONTINUED | OUTPATIENT
Start: 2024-07-02 | End: 2024-07-03

## 2024-07-02 RX ORDER — TRANEXAMIC ACID 10 MG/ML
1000 INJECTION, SOLUTION INTRAVENOUS
Status: DISCONTINUED | OUTPATIENT
Start: 2024-07-02 | End: 2024-07-03

## 2024-07-02 RX ORDER — OXYCODONE HYDROCHLORIDE 5 MG/1
5 TABLET ORAL EVERY 4 HOURS PRN
Status: DISCONTINUED | OUTPATIENT
Start: 2024-07-02 | End: 2024-07-03

## 2024-07-02 ASSESSMENT — LIFESTYLE VARIABLES
HOW MANY STANDARD DRINKS CONTAINING ALCOHOL DO YOU HAVE ON A TYPICAL DAY: PATIENT DOES NOT DRINK
HOW OFTEN DO YOU HAVE A DRINK CONTAINING ALCOHOL: NEVER

## 2024-07-03 ENCOUNTER — ANESTHESIA (OUTPATIENT)
Dept: LABOR AND DELIVERY | Age: 24
End: 2024-07-03
Payer: COMMERCIAL

## 2024-07-03 ENCOUNTER — ANESTHESIA EVENT (OUTPATIENT)
Dept: LABOR AND DELIVERY | Age: 24
End: 2024-07-03
Payer: COMMERCIAL

## 2024-07-03 PROBLEM — N90.89 VULVAR LESION: Status: ACTIVE | Noted: 2024-07-03

## 2024-07-03 PROBLEM — O98.313 CONDYLOMA ACUMINATA OF VULVA DURING PREGNANCY IN THIRD TRIMESTER: Status: ACTIVE | Noted: 2024-07-03

## 2024-07-03 PROBLEM — A63.0 CONDYLOMA ACUMINATA OF VULVA DURING PREGNANCY IN THIRD TRIMESTER: Status: ACTIVE | Noted: 2024-07-03

## 2024-07-03 LAB
A VAGINAE DNA VAG QL NAA+PROBE: ABNORMAL SCORE
ABO + RH BLD: NORMAL
BLOOD GROUP ANTIBODIES SERPL: NORMAL
BVAB2 DNA VAG QL NAA+PROBE: ABNORMAL SCORE
C ALBICANS DNA VAG QL NAA+PROBE: POSITIVE
C GLABRATA DNA VAG QL NAA+PROBE: NEGATIVE
C TRACH RRNA SPEC QL NAA+PROBE: NEGATIVE
CREAT UR-MCNC: 69.1 MG/DL (ref 28–217)
MEGA1 DNA VAG QL NAA+PROBE: ABNORMAL SCORE
N GONORRHOEA RRNA SPEC QL NAA+PROBE: NEGATIVE
PROT UR-MCNC: 10 MG/DL
PROT/CREAT UR-RTO: 0.1
SPECIMEN EXP DATE BLD: NORMAL
SPECIMEN SOURCE: ABNORMAL
T PALLIDUM AB SER QL IA: NONREACTIVE
T VAGINALIS RRNA SPEC QL NAA+PROBE: NEGATIVE

## 2024-07-03 PROCEDURE — 00HU33Z INSERTION OF INFUSION DEVICE INTO SPINAL CANAL, PERCUTANEOUS APPROACH: ICD-10-PCS | Performed by: ANESTHESIOLOGY

## 2024-07-03 PROCEDURE — 6370000000 HC RX 637 (ALT 250 FOR IP): Performed by: OBSTETRICS & GYNECOLOGY

## 2024-07-03 PROCEDURE — 2580000003 HC RX 258: Performed by: OBSTETRICS & GYNECOLOGY

## 2024-07-03 PROCEDURE — 7100000010 HC PHASE II RECOVERY - FIRST 15 MIN

## 2024-07-03 PROCEDURE — 6360000002 HC RX W HCPCS: Performed by: OBSTETRICS & GYNECOLOGY

## 2024-07-03 PROCEDURE — 3700000025 EPIDURAL BLOCK: Performed by: ANESTHESIOLOGY

## 2024-07-03 PROCEDURE — 7100000011 HC PHASE II RECOVERY - ADDTL 15 MIN

## 2024-07-03 PROCEDURE — 59409 OBSTETRICAL CARE: CPT | Performed by: OBSTETRICS & GYNECOLOGY

## 2024-07-03 PROCEDURE — 0UQMXZZ REPAIR VULVA, EXTERNAL APPROACH: ICD-10-PCS | Performed by: OBSTETRICS & GYNECOLOGY

## 2024-07-03 PROCEDURE — 7220000101 HC DELIVERY VAGINAL/SINGLE

## 2024-07-03 PROCEDURE — 56605 BIOPSY OF VULVA/PERINEUM: CPT | Performed by: OBSTETRICS & GYNECOLOGY

## 2024-07-03 PROCEDURE — 0UBMXZZ EXCISION OF VULVA, EXTERNAL APPROACH: ICD-10-PCS | Performed by: OBSTETRICS & GYNECOLOGY

## 2024-07-03 PROCEDURE — 7210000100 HC LABOR FEE PER 1 HR

## 2024-07-03 PROCEDURE — 2500000003 HC RX 250 WO HCPCS: Performed by: OBSTETRICS & GYNECOLOGY

## 2024-07-03 PROCEDURE — 6360000002 HC RX W HCPCS: Performed by: NURSE ANESTHETIST, CERTIFIED REGISTERED

## 2024-07-03 PROCEDURE — 88305 TISSUE EXAM BY PATHOLOGIST: CPT

## 2024-07-03 PROCEDURE — 51702 INSERT TEMP BLADDER CATH: CPT

## 2024-07-03 PROCEDURE — 1100000000 HC RM PRIVATE

## 2024-07-03 PROCEDURE — 2500000003 HC RX 250 WO HCPCS: Performed by: NURSE ANESTHETIST, CERTIFIED REGISTERED

## 2024-07-03 RX ORDER — ONDANSETRON 2 MG/ML
4 INJECTION INTRAMUSCULAR; INTRAVENOUS EVERY 6 HOURS PRN
Status: DISCONTINUED | OUTPATIENT
Start: 2024-07-03 | End: 2024-07-04 | Stop reason: HOSPADM

## 2024-07-03 RX ORDER — SODIUM CHLORIDE 0.9 % (FLUSH) 0.9 %
5-40 SYRINGE (ML) INJECTION PRN
Status: DISCONTINUED | OUTPATIENT
Start: 2024-07-03 | End: 2024-07-04 | Stop reason: HOSPADM

## 2024-07-03 RX ORDER — ONDANSETRON 4 MG/1
4 TABLET, ORALLY DISINTEGRATING ORAL EVERY 6 HOURS PRN
Status: DISCONTINUED | OUTPATIENT
Start: 2024-07-03 | End: 2024-07-04 | Stop reason: HOSPADM

## 2024-07-03 RX ORDER — SODIUM CHLORIDE 0.9 % (FLUSH) 0.9 %
5-40 SYRINGE (ML) INJECTION EVERY 12 HOURS SCHEDULED
Status: DISCONTINUED | OUTPATIENT
Start: 2024-07-03 | End: 2024-07-04 | Stop reason: HOSPADM

## 2024-07-03 RX ORDER — ACETAMINOPHEN 500 MG
1000 TABLET ORAL EVERY 8 HOURS SCHEDULED
Status: DISCONTINUED | OUTPATIENT
Start: 2024-07-03 | End: 2024-07-03

## 2024-07-03 RX ORDER — CALCIUM CARBONATE 500 MG/1
1000 TABLET, CHEWABLE ORAL DAILY PRN
Status: DISCONTINUED | OUTPATIENT
Start: 2024-07-03 | End: 2024-07-03

## 2024-07-03 RX ORDER — FAMOTIDINE 20 MG/1
20 TABLET, FILM COATED ORAL 2 TIMES DAILY PRN
Status: DISCONTINUED | OUTPATIENT
Start: 2024-07-03 | End: 2024-07-04 | Stop reason: HOSPADM

## 2024-07-03 RX ORDER — SODIUM CHLORIDE 9 MG/ML
INJECTION, SOLUTION INTRAVENOUS PRN
Status: DISCONTINUED | OUTPATIENT
Start: 2024-07-03 | End: 2024-07-04 | Stop reason: HOSPADM

## 2024-07-03 RX ORDER — ROPIVACAINE HYDROCHLORIDE 2 MG/ML
INJECTION, SOLUTION EPIDURAL; INFILTRATION; PERINEURAL PRN
Status: DISCONTINUED | OUTPATIENT
Start: 2024-07-03 | End: 2024-07-03 | Stop reason: SDUPTHER

## 2024-07-03 RX ORDER — MISOPROSTOL 200 UG/1
200 TABLET ORAL PRN
Status: DISCONTINUED | OUTPATIENT
Start: 2024-07-03 | End: 2024-07-04 | Stop reason: HOSPADM

## 2024-07-03 RX ORDER — ACETAMINOPHEN 500 MG
1000 TABLET ORAL EVERY 8 HOURS SCHEDULED
Status: DISCONTINUED | OUTPATIENT
Start: 2024-07-03 | End: 2024-07-04 | Stop reason: HOSPADM

## 2024-07-03 RX ORDER — DEXTROSE, SODIUM CHLORIDE, SODIUM LACTATE, POTASSIUM CHLORIDE, AND CALCIUM CHLORIDE 5; .6; .31; .03; .02 G/100ML; G/100ML; G/100ML; G/100ML; G/100ML
INJECTION, SOLUTION INTRAVENOUS CONTINUOUS
Status: DISCONTINUED | OUTPATIENT
Start: 2024-07-03 | End: 2024-07-04 | Stop reason: HOSPADM

## 2024-07-03 RX ORDER — ROPIVACAINE HYDROCHLORIDE 2 MG/ML
INJECTION, SOLUTION EPIDURAL; INFILTRATION; PERINEURAL CONTINUOUS PRN
Status: DISCONTINUED | OUTPATIENT
Start: 2024-07-03 | End: 2024-07-03 | Stop reason: SDUPTHER

## 2024-07-03 RX ORDER — DOCUSATE SODIUM 100 MG/1
100 CAPSULE, LIQUID FILLED ORAL 2 TIMES DAILY
Status: DISCONTINUED | OUTPATIENT
Start: 2024-07-03 | End: 2024-07-04 | Stop reason: HOSPADM

## 2024-07-03 RX ORDER — LANOLIN
CREAM (ML) TOPICAL PRN
Status: DISCONTINUED | OUTPATIENT
Start: 2024-07-03 | End: 2024-07-04 | Stop reason: HOSPADM

## 2024-07-03 RX ORDER — IBUPROFEN 800 MG/1
800 TABLET ORAL EVERY 8 HOURS SCHEDULED
Status: DISCONTINUED | OUTPATIENT
Start: 2024-07-03 | End: 2024-07-04 | Stop reason: HOSPADM

## 2024-07-03 RX ORDER — FAMOTIDINE 20 MG/1
20 TABLET, FILM COATED ORAL 2 TIMES DAILY PRN
Status: DISCONTINUED | OUTPATIENT
Start: 2024-07-03 | End: 2024-07-03

## 2024-07-03 RX ORDER — LIDOCAINE HYDROCHLORIDE AND EPINEPHRINE 15; 5 MG/ML; UG/ML
INJECTION, SOLUTION EPIDURAL PRN
Status: DISCONTINUED | OUTPATIENT
Start: 2024-07-03 | End: 2024-07-03 | Stop reason: SDUPTHER

## 2024-07-03 RX ORDER — SIMETHICONE 80 MG
80 TABLET,CHEWABLE ORAL EVERY 6 HOURS PRN
Status: DISCONTINUED | OUTPATIENT
Start: 2024-07-03 | End: 2024-07-04 | Stop reason: HOSPADM

## 2024-07-03 RX ORDER — OXYCODONE HYDROCHLORIDE 5 MG/1
10 TABLET ORAL EVERY 4 HOURS PRN
Status: DISCONTINUED | OUTPATIENT
Start: 2024-07-03 | End: 2024-07-04 | Stop reason: HOSPADM

## 2024-07-03 RX ORDER — OXYCODONE HYDROCHLORIDE 5 MG/1
5 TABLET ORAL EVERY 4 HOURS PRN
Status: DISCONTINUED | OUTPATIENT
Start: 2024-07-03 | End: 2024-07-04 | Stop reason: HOSPADM

## 2024-07-03 RX ORDER — METHYLERGONOVINE MALEATE 0.2 MG/ML
200 INJECTION INTRAVENOUS PRN
Status: DISCONTINUED | OUTPATIENT
Start: 2024-07-03 | End: 2024-07-04 | Stop reason: HOSPADM

## 2024-07-03 RX ADMIN — IBUPROFEN 800 MG: 800 TABLET, FILM COATED ORAL at 22:19

## 2024-07-03 RX ADMIN — ANTACID TABLETS 1000 MG: 500 TABLET, CHEWABLE ORAL at 02:02

## 2024-07-03 RX ADMIN — DOCUSATE SODIUM 100 MG: 100 CAPSULE, LIQUID FILLED ORAL at 22:19

## 2024-07-03 RX ADMIN — OXYTOCIN 1 MILLI-UNITS/MIN: 10 INJECTION INTRAVENOUS at 10:22

## 2024-07-03 RX ADMIN — DOCUSATE SODIUM 100 MG: 100 CAPSULE, LIQUID FILLED ORAL at 16:40

## 2024-07-03 RX ADMIN — IBUPROFEN 800 MG: 800 TABLET, FILM COATED ORAL at 14:52

## 2024-07-03 RX ADMIN — ACETAMINOPHEN 650 MG: 325 TABLET, FILM COATED ORAL at 09:30

## 2024-07-03 RX ADMIN — LIDOCAINE HYDROCHLORIDE,EPINEPHRINE BITARTRATE 3 ML: 15; .005 INJECTION, SOLUTION EPIDURAL; INFILTRATION; INTRACAUDAL; PERINEURAL at 01:26

## 2024-07-03 RX ADMIN — FAMOTIDINE 20 MG: 20 TABLET, FILM COATED ORAL at 04:41

## 2024-07-03 RX ADMIN — SODIUM CHLORIDE, SODIUM LACTATE, POTASSIUM CHLORIDE, CALCIUM CHLORIDE AND DEXTROSE MONOHYDRATE: 5; 600; 310; 30; 20 INJECTION, SOLUTION INTRAVENOUS at 00:16

## 2024-07-03 RX ADMIN — ROPIVACAINE HYDROCHLORIDE 8 ML/HR: 2 INJECTION, SOLUTION EPIDURAL; INFILTRATION; PERINEURAL at 01:31

## 2024-07-03 RX ADMIN — MORPHINE SULFATE 2 MG: 2 INJECTION, SOLUTION INTRAMUSCULAR; INTRAVENOUS at 00:17

## 2024-07-03 RX ADMIN — ROPIVACAINE HYDROCHLORIDE 6 ML: 2 INJECTION, SOLUTION EPIDURAL; INFILTRATION; PERINEURAL at 01:29

## 2024-07-03 RX ADMIN — ACETAMINOPHEN 1000 MG: 500 TABLET, FILM COATED ORAL at 17:59

## 2024-07-03 RX ADMIN — ROPIVACAINE HYDROCHLORIDE 4 ML: 2 INJECTION, SOLUTION EPIDURAL; INFILTRATION; PERINEURAL at 01:31

## 2024-07-03 ASSESSMENT — PAIN DESCRIPTION - DESCRIPTORS
DESCRIPTORS: ACHING
DESCRIPTORS: CRAMPING
DESCRIPTORS: CRAMPING
DESCRIPTORS: ACHING;DISCOMFORT;SORE

## 2024-07-03 ASSESSMENT — PAIN SCALES - GENERAL
PAINLEVEL_OUTOF10: 7
PAINLEVEL_OUTOF10: 2
PAINLEVEL_OUTOF10: 6
PAINLEVEL_OUTOF10: 7

## 2024-07-03 ASSESSMENT — PAIN DESCRIPTION - LOCATION
LOCATION: ABDOMEN
LOCATION: ABDOMEN;BACK
LOCATION: VAGINA
LOCATION: HEAD

## 2024-07-03 ASSESSMENT — PAIN DESCRIPTION - ORIENTATION: ORIENTATION: LOWER

## 2024-07-03 ASSESSMENT — PAIN DESCRIPTION - FREQUENCY: FREQUENCY: INTERMITTENT

## 2024-07-03 NOTE — PROGRESS NOTES
Post-Partum Day Number 1 Progress Note  Sara España  [id number]    Patient doing well post-partum without significant complaint.  Voiding without difficulty, normal lochia.    Vitals:  Patient Vitals for the past 24 hrs:   BP Temp Temp src Pulse Resp SpO2   24 0729 115/70 98 °F (36.7 °C) Oral 73 -- 98 %   24 0304 (!) 118/54 97.9 °F (36.6 °C) Oral 100 -- 99 %   24 2308 (!) 140/88 98.2 °F (36.8 °C) Oral 85 20 99 %   24 1946 130/79 98.8 °F (37.1 °C) -- 99 17 99 %   24 1651 118/77 98.1 °F (36.7 °C) Oral 98 16 96 %   24 1418 130/82 98.9 °F (37.2 °C) Axillary (!) 109 16 99 %   24 1327 123/73 -- -- 90 -- --   24 1312 122/72 -- -- 93 -- --   24 1258 135/76 -- -- 97 -- --   24 1227 131/75 -- -- 98 -- --   24 1212 127/70 -- -- 100 -- --   24 1157 121/76 -- -- (!) 106 -- --   24 1142 120/63 -- -- (!) 115 -- --   24 1127 117/60 -- -- (!) 121 -- --   24 1124 -- 99.1 °F (37.3 °C) Oral -- -- --   24 1057 (!) 119/56 -- -- (!) 126 -- --     Temp (24hrs), Av.4 °F (36.9 °C), Min:97.9 °F (36.6 °C), Max:99.1 °F (37.3 °C)      Vital signs stable, afebrile.    Exam:  Patient without distress.               Abdomen soft, fundus firm at level of umbilicus, nontender               Labs:   No results found for this or any previous visit (from the past 24 hour(s)).      Assessment and Plan:  Patient appears to be having uncomplicated post-partum course.  Continue routine perineal care and maternal education.  Wants to go home.    +/+  [place] pt
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
## 2024-07-03 NOTE — PLAN OF CARE
Problem: Pain  Goal: Verbalizes/displays adequate comfort level or baseline comfort level  7/3/2024 0838 by Sola Villavicencio RN  Outcome: Progressing  7/3/2024 0410 by Niurka Krueger RN  Outcome: Progressing     Problem: Vaginal Birth or  Section  Goal: Fetal and maternal status remain reassuring during the birth process  Description:  Birth OB-Pregnancy care plan goal which identifies if the fetal and maternal status remain reassuring during the birth process  7/3/2024 0838 by Sola Villavicencio RN  Outcome: Progressing  7/3/2024 0410 by Niurka Krueger RN  Outcome: Progressing     Problem: Infection - Adult  Goal: Absence of infection at discharge  7/3/2024 0838 by Sola Villavicencio RN  Outcome: Progressing  7/3/2024 0410 by Niurka Krueger RN  Outcome: Progressing  Goal: Absence of infection during hospitalization  7/3/2024 0838 by Sola Villavicencio RN  Outcome: Progressing  7/3/2024 0410 by Niurka Krueger RN  Outcome: Progressing     Problem: Safety - Adult  Goal: Free from fall injury  7/3/2024 0838 by Sola Villavicencio RN  Outcome: Progressing  7/3/2024 0410 by Niurka Krueger RN  Outcome: Progressing     Problem: Discharge Planning  Goal: Discharge to home or other facility with appropriate resources  7/3/2024 0410 by Niurka Krueger RN  Outcome: Progressing

## 2024-07-03 NOTE — PROGRESS NOTES
OB ED     Pt to [facility] with complaints of rupture of membranes around 1940. Claims she is steve every 5-6 minutes rating her pain 7/10. EFM and TOCO applied. Abd palpated soft. Positive fetal movement noted, denies VB. [facility] notified of patient's arrival.     Amnisure POSITIVE  SVE 2/80/-2

## 2024-07-03 NOTE — L&D DELIVERY NOTE
At 11:13AM, this 24 y.o.  at 38w3d delivered a vigorous male  infant from vtx presentation to a sterile field under epidural anesthesia. Cord clamped x 2, cut. Infant bulb suctioned and put to mother's chest. APGARs 8 at 1 minute and 9 at 5 minutes. Wt 3180g. 3VC/SI placenta delivered. 30U Pitocin given IV. Fundus firm. Bleeding scant.  Bilateral 1st degree labia and periurethral lacerations repaired in normal fashion with 3-0 vicryl. 1-2cm condyloma noted on perineum that was removed and sent to pathology. No packs in vagina. EBL 150cc. Mom and infant stable in LDR.     Staff: Mario Hart [[id number]]      Labor Events     Labor: No   Steroids: None  Cervical Ripening Date/Time:      Antibiotics Received during Labor: No  Rupture Date/Time:  24 19:40:00   Rupture Type: SROM  Fluid Color: Clear  Fluid Odor: None  Fluid Volume: Scant  Induction: None  Labor Complications: None              Anesthesia    Method: Epidural       Labor Event Times      Labor onset date/time:        Dilation complete date/time:  7/3/24 10:44:00     Start pushing date/time:  7/3/2024 10:49:00   Decision date/time (emergent ):            Labor Length    2nd stage: 0h 29m  3rd stage: 0h 03m       Delivery Details      Delivery Date: 7/3/24 Delivery Time: 11:13:00   Delivery Type: Vaginal, Spontaneous               Presentation    Presentation: Vertex       Shoulder Dystocia    Shoulder Dystocia Present?: No       Assisted Delivery Details    Forceps Attempted?: No  Vacuum Extractor Attempted?: No                           Cord    Vessels: 3 Vessels  Complications: None  Delayed Cord Clamping?: Yes  Cord Clamped Date/Time: 7/3/2024 11:14:00  Cord Blood Disposition: Lab  Gases Sent?: No              Placenta    Date/Time: 7/3/2024 11:16:25  Removal: Spontaneous  Appearance: Intact  Disposition: Discarded       Lacerations    Episiotomy: None  Perineal Lacerations:

## 2024-07-03 NOTE — PROGRESS NOTES
Pt oriented to room and call light. Admission assessment complete see flowsheet. Discussed today plan of care with pt. Bleeding precautions given. No s/s of distress noted at this time. Pt denies pain at this time. Questions encouraged and answered. Pt to call with needs/concerns. Pt in bed with call light in reach.

## 2024-07-03 NOTE — ANESTHESIA PRE PROCEDURE
Department of Anesthesiology  Preprocedure Note       Name:  Sara España   Age:  24 y.o.  :  2000                                          MRN:  [id number]         Date:  7/3/2024      Surgeon: * No surgeons listed *    Procedure: * No procedures listed *    Medications prior to admission:   Prior to Admission medications    Medication Sig Start Date End Date Taking? Authorizing Provider   famotidine (PEPCID) 20 MG tablet Take 1 tablet by mouth 2 times daily  Patient taking differently: Take 1 tablet by mouth 2 times daily as needed 24   Yannick Sarabia MD   MAGNESIUM PO Take by mouth    ProviderKarli MD   ferrous sulfate (IRON 325) 325 (65 Fe) MG tablet Take 1 tablet by mouth 2 times daily 24   Corazon Pavon, APRN - CNP   Prenatal w/o A Vit-Fe Fum-FA (PRENATA PO) Take by mouth    ProviderKarli MD       Current medications:    Current Facility-Administered Medications   Medication Dose Route Frequency Provider Last Rate Last Admin    terbutaline (BRETHINE) injection 0.25 mg  0.25 mg SubCUTAneous Once Boogie Wade MD        dextrose 5 % in lactated ringers infusion   IntraVENous Continuous Boogie Wade  mL/hr at 24 0016 New Bag at 24 0016    lactated ringers bolus 500 mL  500 mL IntraVENous PRN Boogie Wade MD        Or    lactated ringers bolus 1,000 mL  1,000 mL IntraVENous PRN Boogie Wade MD        sodium chloride flush 0.9 % injection 5-40 mL  5-40 mL IntraVENous 2 times per day Boogie Wade MD        sodium chloride flush 0.9 % injection 5-40 mL  5-40 mL IntraVENous PRN Boogie Wade MD        0.9 % sodium chloride infusion   IntraVENous PRN Boogie Wade MD        ondansetron (ZOFRAN) injection 4 mg  4 mg IntraVENous Q6H PRN Boogie Wade MD        Or    ondansetron (ZOFRAN-ODT) disintegrating tablet 4 mg  4 mg Oral Q6H PRN Boogie Wade MD        oxytocin (PITOCIN) 30 units in 500 mL

## 2024-07-03 NOTE — ANESTHESIA POSTPROCEDURE EVALUATION
Department of Anesthesiology  Postprocedure Note    Patient: Sara España  MRN: [id number]  YOB: 2000  Date of evaluation: 7/3/2024    Procedure Summary       Date: 07/03/24 Room / Location:     Anesthesia Start: 0117 Anesthesia Stop: 1113    Procedure: Labor Analgesia Diagnosis:     Scheduled Providers:  Responsible Provider: Fercho Prabhakar MD    Anesthesia Type: epidural ASA Status: 2            Anesthesia Type: No value filed.    Vinicius Phase I:      Vinicius Phase II:      Anesthesia Post Evaluation    Patient location during evaluation: PACU  Patient participation: complete - patient participated  Level of consciousness: awake and alert  Airway patency: patent  Nausea & Vomiting: no nausea and no vomiting  Cardiovascular status: hemodynamically stable  Respiratory status: acceptable, nonlabored ventilation and spontaneous ventilation  Hydration status: euvolemic  Comments: /77   Pulse 98   Temp 98.1 °F (36.7 °C) (Oral)   Resp 16   Ht 1.6 m (5' 3\")   Wt 94.8 kg (209 lb)   LMP 10/08/2023   SpO2 96%   Breastfeeding Unknown   BMI 37.02 kg/m²     The patient was satisfied with her labor epidural and denies any complications.  Her lower extremities have returned to baseline neurologically.    Multimodal analgesia pain management approach  Pain management: adequate and satisfactory to patient        No notable events documented.

## 2024-07-03 NOTE — ANESTHESIA PROCEDURE NOTES
Epidural Block    Patient location during procedure: OB  Start time: 7/3/2024 1:19 AM  End time: 7/3/2024 1:24 AM  Reason for block: labor epidural  Staffing  Performed: anesthesiologist   Anesthesiologist: Blaine Luo IV, MD  Performed by: Blaine Luo IV, MD  Authorized by: Blaine Luo IV, MD    Epidural  Patient position: sitting  Prep: ChloraPrep  Patient monitoring: continuous pulse ox and frequent blood pressure checks  Approach: right paramedian  Location: L1-2  Injection technique: SAMM air  Provider prep: sterile gloves and mask  Needle  Needle type: Tuohy   Needle gauge: 17 G  Epidural needle length (in): 9cm.  Needle insertion depth: 5 cm  Catheter type: end hole  Catheter size: 19 G  Catheter at skin depth: 11 cm  Test dose: negativeCatheter Secured: tegaderm and tape (liquid adhesive)  Assessment  Hemodynamics: stable  Attempts: 1  Outcomes: uncomplicated and patient tolerated procedure well  Additional Notes  Timeout 0118  Preanesthetic Checklist  Completed: patient identified, IV checked, site marked, risks and benefits discussed, surgical/procedural consents, equipment checked, pre-op evaluation, timeout performed, anesthesia consent given, oxygen available and monitors applied/VS acknowledged

## 2024-07-03 NOTE — PLAN OF CARE
Problem: Vaginal Birth or  Section  Goal: Fetal and maternal status remain reassuring during the birth process  Description:  Birth OB-Pregnancy care plan goal which identifies if the fetal and maternal status remain reassuring during the birth process  Outcome: Progressing     Problem: Pain  Goal: Verbalizes/displays adequate comfort level or baseline comfort level  Outcome: Progressing     Problem: Infection - Adult  Goal: Absence of infection at discharge  Outcome: Progressing  Goal: Absence of infection during hospitalization  Outcome: Progressing     Problem: Safety - Adult  Goal: Free from fall injury  Outcome: Progressing     Problem: Discharge Planning  Goal: Discharge to home or other facility with appropriate resources  Outcome: Progressing

## 2024-07-04 VITALS
TEMPERATURE: 98 F | HEART RATE: 73 BPM | RESPIRATION RATE: 20 BRPM | BODY MASS INDEX: 37.03 KG/M2 | DIASTOLIC BLOOD PRESSURE: 70 MMHG | HEIGHT: 63 IN | SYSTOLIC BLOOD PRESSURE: 115 MMHG | WEIGHT: 209 LBS | OXYGEN SATURATION: 98 %

## 2024-07-04 PROCEDURE — 6370000000 HC RX 637 (ALT 250 FOR IP): Performed by: OBSTETRICS & GYNECOLOGY

## 2024-07-04 RX ORDER — IBUPROFEN 800 MG/1
800 TABLET ORAL EVERY 8 HOURS SCHEDULED
Qty: 120 TABLET | Refills: 3 | Status: SHIPPED | OUTPATIENT
Start: 2024-07-04

## 2024-07-04 RX ADMIN — IBUPROFEN 800 MG: 800 TABLET, FILM COATED ORAL at 13:50

## 2024-07-04 RX ADMIN — ACETAMINOPHEN 1000 MG: 500 TABLET, FILM COATED ORAL at 01:57

## 2024-07-04 RX ADMIN — IBUPROFEN 800 MG: 800 TABLET, FILM COATED ORAL at 06:00

## 2024-07-04 RX ADMIN — DOCUSATE SODIUM 100 MG: 100 CAPSULE, LIQUID FILLED ORAL at 09:37

## 2024-07-04 RX ADMIN — ACETAMINOPHEN 1000 MG: 500 TABLET, FILM COATED ORAL at 09:37

## 2024-07-04 NOTE — DISCHARGE SUMMARY
Discharge Summary     Date of Admission:  2024  8:52 PM  Date of Discharge:  2024       Sara España 24 y.o.  presented at 38w3d for induction/in active labor.  Pt had  without incident.  See delivery note for all delivery details.  Pt's PP course was uneventful.  On day of D/C, she was ambulating well, afebrile, with lochia < menses.  She was discharged home with medications as below.  She plans on unsure for birth control.  HTN diagnosis: no   Routine PP instructions given to patient.  She is to follow up with [facility] in 6 weeks for PP exam.    No problem-specific Assessment & Plan notes found for this encounter.          Medication List        START taking these medications      ibuprofen 800 MG tablet  Commonly known as: ADVIL;MOTRIN  Take 1 tablet by mouth every 8 hours            CHANGE how you take these medications      famotidine 20 MG tablet  Commonly known as: PEPCID  Take 1 tablet by mouth 2 times daily  What changed:   when to take this  reasons to take this            CONTINUE taking these medications      ferrous sulfate 325 (65 Fe) MG tablet  Commonly known as: IRON 325  Take 1 tablet by mouth 2 times daily     MAGNESIUM PO     PRENATA PO               Where to Get Your Medications        These medications were sent to [facility]/pharmacy #[id number] - [place], [place] - [address] [place] [address] - P [phone] - F [phone]  [address] [place] [id number]      Phone: [phone]   ibuprofen 800 MG tablet         Naz Berrios MD  10:57 AM  24

## 2024-07-04 NOTE — PROGRESS NOTES
Shift assessment complete see flowsheet. Discussed today plan of care with pt. Pt requesting to have an early d/c today. Bleeding precautions given. No s/s of distress noted at this time. Questions encouraged and answered. Pt to call with needs/concerns. Pt in bed with call light in reach.

## 2024-07-04 NOTE — PROGRESS NOTES
07/04/24 1459   AVS Reviewed   AVS & discharge instructions reviewed with patient and/or representative? Yes   Reviewed instructions with Patient   Level of Understanding Questions answered;Verbalized understanding

## 2024-07-04 NOTE — CARE COORDINATION
Chart reviewed - first time parent.  Patient with + UDS for THC on 23, and negative UDS on 24.  Umbilical drug screen pending.  SW met with patient to complete initial assessment.    Demographics confirmed due to pending umbilical drug screen.  Patient is currently receiving WIC.  Education provided on how to add baby to WIC (brochure given).      provided education on [facility] [place] [facility].  Family was undecided on need for home visit.  No referral will be made at this time.  Family has this 's contact information should they decide to participate in program.    Patient given informational packet on  mood & anxiety disorders (resources/education).    Family denies any additional needs from  at this time.  Family has 's contact information should any needs/questions arise.    Ruby Crow, MELLY-MATT, [facility]  [facility]   [phone]

## 2024-07-04 NOTE — PROGRESS NOTES
Patient discharged to home per MD orders.  Discharge instructions reviewed with patient and pt given a copy. Questions encouraged and answered. Patient verbalizes understanding. Patient escorted by [facility] staff to private vehicle. Pt declined w/c for d/c.  Stable at discharge.

## 2024-07-05 RX ORDER — METRONIDAZOLE 500 MG/1
500 TABLET ORAL 2 TIMES DAILY
Qty: 14 TABLET | Refills: 0 | Status: SHIPPED | OUTPATIENT
Start: 2024-07-05 | End: 2024-07-12

## 2024-07-05 RX ORDER — FLUCONAZOLE 150 MG/1
150 TABLET ORAL
Qty: 2 TABLET | Refills: 0 | Status: SHIPPED | OUTPATIENT
Start: 2024-07-05 | End: 2024-07-11

## 2024-07-05 NOTE — TELEPHONE ENCOUNTER
Please let pt know nuswab positive for BV and yeast. She just delivered 7/3/24. If still having symptoms she can have the following:    For yeast  can have one of the following for treatment, if not allergic    Diflucan 150 mg PO Take 1 tab and repeat in 3 days if needed, #2, No Refills (NOT TO BE PRESCRIBED IF PREGNANT)    OR    Terazol 7 Insert 5 grams nightly for 7 nights, #7, No Refills        For BV (if not breastfeeding)  may have one of the following for treatment if not allergic    Flagyl 500mg PO BID for 7 days, #14, No Refills   OR    Metrogel Insert 5 grams nightly for 5 nights, #5, No Refills          For BV (if she is breastfeeding)  Clindamycin 2% vaginal cream 5 grams nightly for 7 nights

## 2024-07-09 NOTE — CARE COORDINATION
Results of umbilical drug screen received: + for morphine.    Patient was given morphine on 7/3/24 @ 0017.   was delivered on 7/3/24 @1113.    No further follow-up indicated as patient was administered morphine by hospital staff prior to delivery.    Ruby Crow, MELLY-MATT, [facility]  [facility]   [phone]

## 2024-08-15 ENCOUNTER — POSTPARTUM VISIT (OUTPATIENT)
Dept: OBGYN CLINIC | Age: 24
End: 2024-08-15

## 2024-08-15 VITALS
BODY MASS INDEX: 34.55 KG/M2 | WEIGHT: 195 LBS | HEIGHT: 63 IN | SYSTOLIC BLOOD PRESSURE: 114 MMHG | DIASTOLIC BLOOD PRESSURE: 70 MMHG

## 2024-08-15 PROBLEM — O98.313 CONDYLOMA ACUMINATA OF VULVA DURING PREGNANCY IN THIRD TRIMESTER: Status: RESOLVED | Noted: 2024-07-03 | Resolved: 2024-08-15

## 2024-08-15 PROBLEM — Z34.83 MULTIGRAVIDA IN THIRD TRIMESTER: Status: RESOLVED | Noted: 2023-12-08 | Resolved: 2024-08-15

## 2024-08-15 PROBLEM — O99.321 DRUG USE AFFECTING PREGNANCY, FIRST TRIMESTER: Status: RESOLVED | Noted: 2023-12-10 | Resolved: 2024-08-15

## 2024-08-15 PROBLEM — O99.013 ANEMIA AFFECTING PREGNANCY IN THIRD TRIMESTER: Status: RESOLVED | Noted: 2024-02-11 | Resolved: 2024-08-15

## 2024-08-15 PROBLEM — A63.0 CONDYLOMA ACUMINATA OF VULVA DURING PREGNANCY IN THIRD TRIMESTER: Status: RESOLVED | Noted: 2024-07-03 | Resolved: 2024-08-15

## 2024-08-15 PROBLEM — Z37.9 NORMAL LABOR: Status: RESOLVED | Noted: 2024-07-02 | Resolved: 2024-08-15

## 2024-08-15 PROBLEM — N90.89 VULVAR LESION: Status: RESOLVED | Noted: 2024-07-03 | Resolved: 2024-08-15

## 2024-08-15 PROBLEM — O35.BXX1 ECHOGENIC FOCUS OF HEART OF FETUS AFFECTING ANTEPARTUM CARE OF MOTHER, FETUS 1: Status: RESOLVED | Noted: 2024-03-04 | Resolved: 2024-08-15

## 2024-08-15 PROBLEM — O99.810 ABNORMAL GLUCOSE AFFECTING PREGNANCY: Status: RESOLVED | Noted: 2024-04-24 | Resolved: 2024-08-15

## 2024-08-15 RX ORDER — DESOGESTREL AND ETHINYL ESTRADIOL 0.15-0.03
1 KIT ORAL DAILY
Qty: 1 PACKET | Refills: 12 | Status: SHIPPED | OUTPATIENT
Start: 2024-08-15

## 2024-08-15 NOTE — PROGRESS NOTES
Patient comes in today for 6 week post partum visit.     Delivery Method: Vaginal    Delivery Date: 07/03/2024    Birth Control: none     Breast/Bottle: formula only      Bleeding: none today     Baby: Doing well    Bowel/Bladder: No issues    Blues: None    Last pap smear:  12/08/2023 negative     Vitals:    08/15/24 1132   BP: 114/70        Kerline Martinez MA  11:40 AM  08/15/24

## 2024-08-15 NOTE — PATIENT INSTRUCTIONS
You can start your birth control pills on Sunday. You should try to take them around the same time each day.  Remember the pills may cause irregular bleeding for the first couple of months when starting pills.   Follow up as needed or next year for your yearly female exam.  Thanks for coming to see us today and letting us take care of you!

## 2024-08-15 NOTE — PROGRESS NOTES
[facility]  [address], [place] [address]  [phone]    Yannick Sarabia MD, FACOG  Corazon Pavon [facility]  Jumana Tavera MD, FACOG    6 week Postpartum Office Visit    Sara España is a 24 y.o.  that presents for PP visit today    Delivery Method: Vaginal     Delivery Date: 2024     Birth Control: none, desires OCPs     Breast/Bottle: formula only       Bleeding: none today      Baby: Doing well     Bowel/Bladder: No issues     Blues: None     Last pap smear:  2023 negative    OB History    Para Term  AB Living   2 1 1   1 1   SAB IAB Ectopic Molar Multiple Live Births     1     0 1      # Outcome Date GA Lbr Gerry/2nd Weight Sex Type Anes PTL Lv   2 Term 24 38w3d / 00:29 3.18 kg (7 lb 0.2 oz) M Vag-Spont EPI N NESS   1 IAB                 Past Medical History:   Diagnosis Date    Migraines        Past Surgical History:   Procedure Laterality Date    ACHILLES TENDON SURGERY          Social History     Socioeconomic History    Marital status: Single     Spouse name: Not on file    Number of children: Not on file    Years of education: Not on file    Highest education level: Not on file   Occupational History    Not on file   Tobacco Use    Smoking status: Never    Smokeless tobacco: Never   Vaping Use    Vaping status: Former    Start date: 2019    Quit date: 2023   Substance and Sexual Activity    Alcohol use: Not Currently    Drug use: Never    Sexual activity: Yes     Partners: Male     Birth control/protection: None   Other Topics Concern    Not on file   Social History Narrative    Patient states feeling safe at home, denies abuse.      Social Determinants of Health     Financial Resource Strain: Not on file   Food Insecurity: Not on file (2024)   Transportation Needs: Not on file   Physical Activity: Not on file   Stress: Not on file   Social Connections: Not on file   Intimate Partner Violence: Not on file   Housing

## 2024-08-15 NOTE — ASSESSMENT & PLAN NOTE
Exam as above  Encouraged annual exams with paps as indicated  Pt to F/U with PCP for all non-gyn health related issues

## 2025-08-17 PROBLEM — Z01.419 WOMEN'S ANNUAL ROUTINE GYNECOLOGICAL EXAMINATION: Status: ACTIVE | Noted: 2025-08-17
